# Patient Record
Sex: MALE | Race: BLACK OR AFRICAN AMERICAN | NOT HISPANIC OR LATINO | Employment: FULL TIME | URBAN - METROPOLITAN AREA
[De-identification: names, ages, dates, MRNs, and addresses within clinical notes are randomized per-mention and may not be internally consistent; named-entity substitution may affect disease eponyms.]

---

## 2018-12-24 ENCOUNTER — HOSPITAL ENCOUNTER (EMERGENCY)
Facility: HOSPITAL | Age: 53
Discharge: HOME | End: 2018-12-24
Attending: EMERGENCY MEDICINE

## 2018-12-24 VITALS
OXYGEN SATURATION: 97 % | DIASTOLIC BLOOD PRESSURE: 101 MMHG | WEIGHT: 202.63 LBS | TEMPERATURE: 98 F | HEART RATE: 94 BPM | SYSTOLIC BLOOD PRESSURE: 184 MMHG | RESPIRATION RATE: 15 BRPM | BODY MASS INDEX: 23.44 KG/M2 | HEIGHT: 78 IN

## 2018-12-24 DIAGNOSIS — M47.22 OSTEOARTHRITIS OF SPINE WITH RADICULOPATHY, CERVICAL REGION: ICD-10-CM

## 2018-12-24 DIAGNOSIS — M54.2 NECK PAIN: ICD-10-CM

## 2018-12-24 DIAGNOSIS — M54.2 CERVICALGIA: Primary | ICD-10-CM

## 2018-12-24 PROCEDURE — 99284 EMERGENCY DEPT VISIT MOD MDM: CPT | Mod: 25

## 2018-12-24 RX ORDER — DICLOFENAC SODIUM 50 MG/1
50 TABLET, DELAYED RELEASE ORAL 2 TIMES DAILY
Qty: 20 TABLET | Refills: 0 | OUTPATIENT
Start: 2018-12-24 | End: 2019-11-14

## 2018-12-24 RX ORDER — METHOCARBAMOL 500 MG/1
1000 TABLET, FILM COATED ORAL 3 TIMES DAILY
Qty: 30 TABLET | Refills: 0 | Status: SHIPPED | OUTPATIENT
Start: 2018-12-24 | End: 2018-12-29

## 2018-12-24 RX ORDER — METHYLPREDNISOLONE 4 MG/1
TABLET ORAL
Qty: 1 PACKAGE | Refills: 0 | Status: SHIPPED | OUTPATIENT
Start: 2018-12-24 | End: 2019-01-14

## 2018-12-24 NOTE — ED PROVIDER NOTES
"SCRIBE #1 NOTE: I, Ela Yeny, am scribing for, and in the presence of, FRANCO Marroquin. I have scribed the entire note.       History     Chief Complaint   Patient presents with    Back Pain     pt states there is something wrong with his vertebrae and is having difficulty swallowing     Review of patient's allergies indicates:  No Known Allergies      History of Present Illness     HPI    12/24/2018, 3:21 PM  History obtained from the patient      History of Present Illness: Srinivasan Saucedo III is a 53 y.o. male patient who presents to the Emergency Department for evaluation of upper back pain which onset gradually 3 days ago. Symptoms are constant and moderate in severity. Pt reports of "a burning sensation" to the upper left side of back radiating into the L shoulder. No mitigating or exacerbating factors reported. Associated sxs include trouble swallowing and neck pain. Patient denies any long car drives/travel, fever, chills, saddle anesthesia, bowel/bladder incontinence, neck stiffness, leg pain , and all other sxs at this time. No prior Tx. No further complaints or concerns at this time.         Arrival mode: Personal vehicle     PCP: Primary Doctor No        Past Medical History:  Past medical history reviewed not relevant      Past Surgical History:  Past surgical history reviewed not relevant      Family History:  Family history reviewed not relevant      Social History:  Social History     Tobacco Use    Smoking status: Current Every Day Smoker     Packs/day: 1.00     Types: Cigarettes    Smokeless tobacco: Never Used   Substance and Sexual Activity    Alcohol use: Yes     Comment: occasional    Drug use: No    Sexual activity: Unknown        Review of Systems     Review of Systems   Constitutional: Negative for chills and fever.   HENT: Negative for congestion and sore throat.    Respiratory: Negative for cough and shortness of breath.    Cardiovascular: Negative for chest pain and leg " swelling.   Gastrointestinal: Negative for abdominal pain, diarrhea, nausea and vomiting.   Genitourinary: Negative for dysuria, flank pain and hematuria.   Musculoskeletal: Positive for back pain and neck pain. Negative for joint swelling, myalgias and neck stiffness.   Skin: Negative for rash and wound.   Neurological: Negative for dizziness, light-headedness and headaches.   Psychiatric/Behavioral: Negative for agitation and confusion.   All other systems reviewed and are negative.       Physical Exam     Initial Vitals [12/24/18 1509]   BP Pulse Resp Temp SpO2   (!) 184/101 94 15 98 °F (36.7 °C) 97 %      MAP       --          Physical Exam  Nursing Notes and Vital Signs Reviewed.  Constitutional: Patient is in no apparent distress. Well-developed and well-nourished.  Head: Atraumatic. Normocephalic.  Eyes: PERRL. EOM intact. Conjunctivae are not pale. No scleral icterus.  ENT: Mucous membranes are moist. Oropharynx is clear and symmetric.    Neck: Supple. Full ROM. No lymphadenopathy. No cervical midline bony tenderness, deformities, or step-offs. L cervical paraspinal tenderness radiating to the L shoulder. Pain with rotation of neck to the left.  Cardiovascular: Regular rate. Regular rhythm. No murmurs, rubs, or gallops. Distal pulses are 2+ and symmetric.  Pulmonary/Chest: No respiratory distress. Clear to auscultation bilaterally. No wheezing or rales.  Abdominal: Soft and non-distended.  There is no tenderness.  No rebound, guarding, or rigidity. Good bowel sounds.  Genitourinary: No CVA tenderness  Musculoskeletal: Moves all extremities. No obvious deformities. No edema. No calf tenderness.  Back: No tenderness. No midline bony tenderness, deformities, or step-offs of the T-spine or L-spine. Skin appears normal without abrasions or bruising. No erythema, induration, or fluctuance.   Neurological: Awake and alert. Appropriate for age. negative straight leg raise bilaterally. No strength deficit; equal and  "5/5 in bilateral upper and lower extremities. No light touch sensory deficit. DTRs 2+ and equal. Normal gait. No acute focal neurological deficits noted.  Skin: Warm and dry.  Psychiatric: Normal affect. Good eye contact. Appropriate in content.     ED Course   Procedures  ED Vital Signs:  Vitals:    12/24/18 1509   BP: (!) 184/101   Pulse: 94   Resp: 15   Temp: 98 °F (36.7 °C)   TempSrc: Oral   SpO2: 97%   Weight: 91.9 kg (202 lb 9.6 oz)   Height: 6' 6" (1.981 m)       Abnormal Lab Results:  Labs Reviewed - No data to display     All Lab Results:  none    Imaging Results:  Imaging Results          X-Ray Cervical Spine AP And Lateral (Final result)  Result time 12/24/18 15:47:58    Final result by Yasir Hook Jr., MD (12/24/18 15:47:58)                 Impression:      No acute radiographic abnormality.  Degenerative change at C5-C6 and C6-C7.      Electronically signed by: Yasir Hook Jr., MD  Date:    12/24/2018  Time:    15:47             Narrative:    EXAMINATION:  XR CERVICAL SPINE AP LATERAL    CLINICAL HISTORY:  Cervicalgia    COMPARISON:  None.    FINDINGS:  Normal anatomic alignment.  All cervical vertebral bodies are normal in height.  Severe disc space height loss at C5-C6 and C6-C7 with marginal osteophytes.  There is also severe uncovertebral joint hypertrophy at these levels.  Prevertebral soft tissues are normal.                                            The Emergency Provider reviewed the vital signs and test results, which are outlined above.     ED Discussion     3:56 PM: Discussed with pt all pertinent ED information and results. Informed pt his x-ray no acute abnormality. Discussed pt dx of and plan of tx. Advised pt to f/u with orthopedics in 2 days. Gave pt all f/u and return to the ED instructions. All questions and concerns were addressed at this time. Pt expresses understanding of information and instructions, and is comfortable with plan to discharge. Pt is stable for discharge.    I " discussed with patient and/or family/caretaker that evaluation in the ED does not suggest any emergent or life threatening medical conditions requiring immediate intervention beyond what was provided in the ED, and I believe patient is safe for discharge.  Regardless, an unremarkable evaluation in the ED does not preclude the development or presence of a serious of life threatening condition. As such, patient was instructed to return immediately for any worsening or change in current symptoms.          ED Medication(s):  Medications - No data to display       Current Discharge Medication List      START taking these medications    Details   diclofenac (VOLTAREN) 50 MG EC tablet Take 1 tablet (50 mg total) by mouth 2 (two) times daily.  Qty: 20 tablet, Refills: 0      methocarbamol (ROBAXIN) 500 MG Tab Take 2 tablets (1,000 mg total) by mouth 3 (three) times daily. for 5 days  Qty: 30 tablet, Refills: 0      methylPREDNISolone (MEDROL DOSEPACK) 4 mg tablet As directed  Qty: 1 Package, Refills: 0             Follow-up Information     [facility]. Go in 2 days.    Specialty:  Orthopedics  Contact information:  [address] [facility] [address] [phone]  Additional information:  (off [facility]) 1st floor                        Medical Decision Making     Medical Decision Making:   Clinical Tests:   Radiological Study: Ordered and Reviewed             Scribe Attestation:   Scribe #1: I performed the above scribed service and the documentation accurately describes the services I performed. I attest to the accuracy of the note. 12/24/2018 3:56 PM    Attending:   Physician Attestation Statement for Scribe #1: I, FRANCO Marroquin, personally performed the services described in this documentation, as scribed by Ela Saini, in my presence, and it is both accurate and complete.           Clinical Impression       ICD-10-CM ICD-9-CM   1. Cervicalgia M54.2 723.1   2. Neck pain M54.2  723.1   3. Osteoarthritis of spine with radiculopathy, cervical region M47.22 721.0       Disposition:   Disposition: Discharged  Condition: Stable               FRANCO Lynn  12/28/18 0818

## 2019-01-25 ENCOUNTER — OFFICE VISIT (OUTPATIENT)
Dept: NEUROSURGERY | Facility: CLINIC | Age: 54
End: 2019-01-25
Payer: COMMERCIAL

## 2019-01-25 VITALS
DIASTOLIC BLOOD PRESSURE: 90 MMHG | HEART RATE: 88 BPM | SYSTOLIC BLOOD PRESSURE: 148 MMHG | WEIGHT: 201.94 LBS | BODY MASS INDEX: 23.36 KG/M2 | HEIGHT: 78 IN

## 2019-01-25 DIAGNOSIS — M50.30 DEGENERATIVE DISC DISEASE, CERVICAL: Primary | ICD-10-CM

## 2019-01-25 PROCEDURE — 99999 PR PBB SHADOW E&M-EST. PATIENT-LVL III: CPT | Mod: PBBFAC,,, | Performed by: NEUROLOGICAL SURGERY

## 2019-01-25 PROCEDURE — 3008F PR BODY MASS INDEX (BMI) DOCUMENTED: ICD-10-PCS | Mod: CPTII,S$GLB,, | Performed by: NEUROLOGICAL SURGERY

## 2019-01-25 PROCEDURE — 99999 PR PBB SHADOW E&M-EST. PATIENT-LVL III: ICD-10-PCS | Mod: PBBFAC,,, | Performed by: NEUROLOGICAL SURGERY

## 2019-01-25 PROCEDURE — 99204 OFFICE O/P NEW MOD 45 MIN: CPT | Mod: S$GLB,,, | Performed by: NEUROLOGICAL SURGERY

## 2019-01-25 PROCEDURE — 3008F BODY MASS INDEX DOCD: CPT | Mod: CPTII,S$GLB,, | Performed by: NEUROLOGICAL SURGERY

## 2019-01-25 PROCEDURE — 99204 PR OFFICE/OUTPT VISIT, NEW, LEVL IV, 45-59 MIN: ICD-10-PCS | Mod: S$GLB,,, | Performed by: NEUROLOGICAL SURGERY

## 2019-01-25 RX ORDER — METHOCARBAMOL 750 MG/1
750 TABLET, FILM COATED ORAL 3 TIMES DAILY PRN
Qty: 60 TABLET | Refills: 0 | Status: SHIPPED | OUTPATIENT
Start: 2019-01-25 | End: 2019-11-14

## 2019-02-08 ENCOUNTER — TELEPHONE (OUTPATIENT)
Dept: RADIOLOGY | Facility: HOSPITAL | Age: 54
End: 2019-02-08

## 2019-02-11 ENCOUNTER — TELEPHONE (OUTPATIENT)
Dept: NEUROSURGERY | Facility: CLINIC | Age: 54
End: 2019-02-11

## 2019-02-11 NOTE — TELEPHONE ENCOUNTER
Left message informing pt he may need to be rescheduled, d/t pt canceling MRI or if MRI was done at another facility bring the disc to appt, either way please give call back and left call back number//ns

## 2019-02-21 ENCOUNTER — TELEPHONE (OUTPATIENT)
Dept: NEUROSURGERY | Facility: CLINIC | Age: 54
End: 2019-02-21

## 2019-02-25 ENCOUNTER — TELEPHONE (OUTPATIENT)
Dept: NEUROSURGERY | Facility: CLINIC | Age: 54
End: 2019-02-25

## 2019-02-25 NOTE — TELEPHONE ENCOUNTER
Re: [facility] f/u appt. Called to RS due to schedule/location changes. His f/u appt was moved to Friday 03/08/19 at [facility] instead of 03/06/19. Pt verbalized understanding.

## 2019-03-01 ENCOUNTER — TELEPHONE (OUTPATIENT)
Dept: RADIOLOGY | Facility: HOSPITAL | Age: 54
End: 2019-03-01

## 2019-03-02 ENCOUNTER — HOSPITAL ENCOUNTER (OUTPATIENT)
Dept: RADIOLOGY | Facility: HOSPITAL | Age: 54
Discharge: HOME | End: 2019-03-02
Attending: NEUROLOGICAL SURGERY
Payer: COMMERCIAL

## 2019-03-02 DIAGNOSIS — M50.30 DEGENERATIVE DISC DISEASE, CERVICAL: ICD-10-CM

## 2019-03-02 PROCEDURE — 72141 MRI NECK SPINE W/O DYE: CPT | Mod: TC

## 2019-03-02 PROCEDURE — 72141 MRI CERVICAL SPINE WITHOUT CONTRAST: ICD-10-PCS | Mod: 26,,, | Performed by: RADIOLOGY

## 2019-03-02 PROCEDURE — 72141 MRI NECK SPINE W/O DYE: CPT | Mod: 26,,, | Performed by: RADIOLOGY

## 2019-11-14 ENCOUNTER — HOSPITAL ENCOUNTER (EMERGENCY)
Facility: HOSPITAL | Age: 54
Discharge: HOME | End: 2019-11-14
Attending: FAMILY MEDICINE

## 2019-11-14 VITALS
HEART RATE: 78 BPM | SYSTOLIC BLOOD PRESSURE: 162 MMHG | BODY MASS INDEX: 22.12 KG/M2 | TEMPERATURE: 98 F | WEIGHT: 191.13 LBS | OXYGEN SATURATION: 100 % | HEIGHT: 78 IN | DIASTOLIC BLOOD PRESSURE: 90 MMHG | RESPIRATION RATE: 20 BRPM

## 2019-11-14 DIAGNOSIS — R03.0 ELEVATED BLOOD PRESSURE READING: ICD-10-CM

## 2019-11-14 DIAGNOSIS — M54.50 ACUTE MIDLINE LOW BACK PAIN WITHOUT SCIATICA: Primary | ICD-10-CM

## 2019-11-14 PROCEDURE — 63600175 PHARM REV CODE 636 W HCPCS: Performed by: PHYSICIAN ASSISTANT

## 2019-11-14 PROCEDURE — 96372 THER/PROPH/DIAG INJ SC/IM: CPT

## 2019-11-14 PROCEDURE — 99284 EMERGENCY DEPT VISIT MOD MDM: CPT | Mod: 25

## 2019-11-14 RX ORDER — KETOROLAC TROMETHAMINE 30 MG/ML
15 INJECTION, SOLUTION INTRAMUSCULAR; INTRAVENOUS
Status: COMPLETED | OUTPATIENT
Start: 2019-11-14 | End: 2019-11-14

## 2019-11-14 RX ORDER — TRAMADOL HYDROCHLORIDE 50 MG/1
50 TABLET ORAL EVERY 6 HOURS PRN
Qty: 12 TABLET | Refills: 0 | Status: SHIPPED | OUTPATIENT
Start: 2019-11-14 | End: 2022-09-02

## 2019-11-14 RX ORDER — NAPROXEN 375 MG/1
375 TABLET ORAL 2 TIMES DAILY WITH MEALS
Qty: 12 TABLET | Refills: 0 | Status: SHIPPED | OUTPATIENT
Start: 2019-11-14 | End: 2022-09-02

## 2019-11-14 RX ORDER — ORPHENADRINE CITRATE 100 MG/1
100 TABLET, EXTENDED RELEASE ORAL 2 TIMES DAILY
Qty: 12 TABLET | Refills: 0 | Status: SHIPPED | OUTPATIENT
Start: 2019-11-14 | End: 2022-09-02

## 2019-11-14 RX ADMIN — KETOROLAC TROMETHAMINE 15 MG: 30 INJECTION, SOLUTION INTRAMUSCULAR; INTRAVENOUS at 08:11
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
## 2020-10-25 ENCOUNTER — HOSPITAL ENCOUNTER (OUTPATIENT)
Facility: HOSPITAL | Age: 55
Discharge: HOME | End: 2020-10-26
Attending: EMERGENCY MEDICINE | Admitting: HOSPITALIST

## 2020-10-25 DIAGNOSIS — J18.9 PNEUMONIA OF LEFT LOWER LOBE DUE TO INFECTIOUS ORGANISM: Primary | ICD-10-CM

## 2020-10-25 DIAGNOSIS — R79.89 ELEVATED TROPONIN: ICD-10-CM

## 2020-10-25 DIAGNOSIS — R07.9 CHEST PAIN: ICD-10-CM

## 2020-10-25 DIAGNOSIS — A41.9 SEPSIS, DUE TO UNSPECIFIED ORGANISM, UNSPECIFIED WHETHER ACUTE ORGAN DYSFUNCTION PRESENT: ICD-10-CM

## 2020-10-25 DIAGNOSIS — R00.0 RAPID HEART BEAT: ICD-10-CM

## 2020-10-25 PROBLEM — E87.6 HYPOKALEMIA: Status: ACTIVE | Noted: 2020-10-25

## 2020-10-25 PROBLEM — Z72.0 TOBACCO ABUSE: Status: ACTIVE | Noted: 2020-10-25

## 2020-10-25 LAB
ALBUMIN SERPL BCP-MCNC: 3.4 G/DL (ref 3.5–5.2)
ALP SERPL-CCNC: 121 U/L (ref 55–135)
ALT SERPL W/O P-5'-P-CCNC: 17 U/L (ref 10–44)
ANION GAP SERPL CALC-SCNC: 15 MMOL/L (ref 8–16)
AST SERPL-CCNC: 20 U/L (ref 10–40)
BASOPHILS # BLD AUTO: 0.03 K/UL (ref 0–0.2)
BASOPHILS NFR BLD: 0.1 % (ref 0–1.9)
BILIRUB SERPL-MCNC: 1.5 MG/DL (ref 0.1–1)
BILIRUB UR QL STRIP: ABNORMAL
BNP SERPL-MCNC: 22 PG/ML (ref 0–99)
BUN SERPL-MCNC: 15 MG/DL (ref 6–20)
CALCIUM SERPL-MCNC: 9.7 MG/DL (ref 8.7–10.5)
CHLORIDE SERPL-SCNC: 102 MMOL/L (ref 95–110)
CLARITY UR: CLEAR
CO2 SERPL-SCNC: 21 MMOL/L (ref 23–29)
COLOR UR: YELLOW
CREAT SERPL-MCNC: 1.4 MG/DL (ref 0.5–1.4)
D DIMER PPP IA.FEU-MCNC: 2.57 MG/L FEU
DIFFERENTIAL METHOD: ABNORMAL
EOSINOPHIL # BLD AUTO: 0 K/UL (ref 0–0.5)
EOSINOPHIL NFR BLD: 0 % (ref 0–8)
ERYTHROCYTE [DISTWIDTH] IN BLOOD BY AUTOMATED COUNT: 13 % (ref 11.5–14.5)
EST. GFR  (AFRICAN AMERICAN): >60 ML/MIN/1.73 M^2
EST. GFR  (NON AFRICAN AMERICAN): 56 ML/MIN/1.73 M^2
GLUCOSE SERPL-MCNC: 144 MG/DL (ref 70–110)
GLUCOSE UR QL STRIP: NEGATIVE
HCT VFR BLD AUTO: 38.8 % (ref 40–54)
HCV AB SERPL QL IA: NEGATIVE
HGB BLD-MCNC: 13.2 G/DL (ref 14–18)
HGB UR QL STRIP: NEGATIVE
HIV 1+2 AB+HIV1 P24 AG SERPL QL IA: NEGATIVE
IMM GRANULOCYTES # BLD AUTO: 0.19 K/UL (ref 0–0.04)
IMM GRANULOCYTES NFR BLD AUTO: 0.9 % (ref 0–0.5)
INFLUENZA A, MOLECULAR: NEGATIVE
INFLUENZA B, MOLECULAR: NEGATIVE
KETONES UR QL STRIP: ABNORMAL
LACTATE SERPL-SCNC: 0.8 MMOL/L (ref 0.5–2.2)
LACTATE SERPL-SCNC: 0.9 MMOL/L (ref 0.5–2.2)
LEUKOCYTE ESTERASE UR QL STRIP: NEGATIVE
LYMPHOCYTES # BLD AUTO: 1.2 K/UL (ref 1–4.8)
LYMPHOCYTES NFR BLD: 5.9 % (ref 18–48)
MCH RBC QN AUTO: 34.9 PG (ref 27–31)
MCHC RBC AUTO-ENTMCNC: 34 G/DL (ref 32–36)
MCV RBC AUTO: 103 FL (ref 82–98)
MONOCYTES # BLD AUTO: 1.5 K/UL (ref 0.3–1)
MONOCYTES NFR BLD: 7.2 % (ref 4–15)
NEUTROPHILS # BLD AUTO: 17.7 K/UL (ref 1.8–7.7)
NEUTROPHILS NFR BLD: 85.9 % (ref 38–73)
NITRITE UR QL STRIP: NEGATIVE
NRBC BLD-RTO: 0 /100 WBC
PH UR STRIP: 6 [PH] (ref 5–8)
PLATELET # BLD AUTO: 407 K/UL (ref 150–350)
PMV BLD AUTO: 9.7 FL (ref 9.2–12.9)
POTASSIUM SERPL-SCNC: 3.3 MMOL/L (ref 3.5–5.1)
PROT SERPL-MCNC: 8.3 G/DL (ref 6–8.4)
PROT UR QL STRIP: ABNORMAL
RBC # BLD AUTO: 3.78 M/UL (ref 4.6–6.2)
SARS-COV-2 RDRP RESP QL NAA+PROBE: NEGATIVE
SODIUM SERPL-SCNC: 138 MMOL/L (ref 136–145)
SP GR UR STRIP: >=1.03 (ref 1–1.03)
SPECIMEN SOURCE: NORMAL
TROPONIN I SERPL DL<=0.01 NG/ML-MCNC: 0.02 NG/ML (ref 0–0.03)
TROPONIN I SERPL DL<=0.01 NG/ML-MCNC: 0.03 NG/ML (ref 0–0.03)
URN SPEC COLLECT METH UR: ABNORMAL
UROBILINOGEN UR STRIP-ACNC: ABNORMAL EU/DL
WBC # BLD AUTO: 20.61 K/UL (ref 3.9–12.7)

## 2020-10-25 PROCEDURE — 83880 ASSAY OF NATRIURETIC PEPTIDE: CPT

## 2020-10-25 PROCEDURE — U0002 COVID-19 LAB TEST NON-CDC: HCPCS

## 2020-10-25 PROCEDURE — 93010 ELECTROCARDIOGRAM REPORT: CPT | Mod: ,,, | Performed by: INTERNAL MEDICINE

## 2020-10-25 PROCEDURE — G0378 HOSPITAL OBSERVATION PER HR: HCPCS

## 2020-10-25 PROCEDURE — 83605 ASSAY OF LACTIC ACID: CPT | Mod: 91

## 2020-10-25 PROCEDURE — 96361 HYDRATE IV INFUSION ADD-ON: CPT

## 2020-10-25 PROCEDURE — 93010 EKG 12-LEAD: ICD-10-PCS | Mod: ,,, | Performed by: INTERNAL MEDICINE

## 2020-10-25 PROCEDURE — 86803 HEPATITIS C AB TEST: CPT

## 2020-10-25 PROCEDURE — 84484 ASSAY OF TROPONIN QUANT: CPT

## 2020-10-25 PROCEDURE — 25000003 PHARM REV CODE 250: Performed by: NURSE PRACTITIONER

## 2020-10-25 PROCEDURE — 84484 ASSAY OF TROPONIN QUANT: CPT | Mod: 91

## 2020-10-25 PROCEDURE — 25000003 PHARM REV CODE 250: Performed by: EMERGENCY MEDICINE

## 2020-10-25 PROCEDURE — 99900035 HC TECH TIME PER 15 MIN (STAT)

## 2020-10-25 PROCEDURE — 63600175 PHARM REV CODE 636 W HCPCS: Performed by: NURSE PRACTITIONER

## 2020-10-25 PROCEDURE — 85025 COMPLETE CBC W/AUTO DIFF WBC: CPT

## 2020-10-25 PROCEDURE — 81003 URINALYSIS AUTO W/O SCOPE: CPT

## 2020-10-25 PROCEDURE — 87502 INFLUENZA DNA AMP PROBE: CPT

## 2020-10-25 PROCEDURE — 99291 CRITICAL CARE FIRST HOUR: CPT | Mod: 25

## 2020-10-25 PROCEDURE — 25500020 PHARM REV CODE 255: Performed by: EMERGENCY MEDICINE

## 2020-10-25 PROCEDURE — 85379 FIBRIN DEGRADATION QUANT: CPT

## 2020-10-25 PROCEDURE — 93005 ELECTROCARDIOGRAM TRACING: CPT

## 2020-10-25 PROCEDURE — 86703 HIV-1/HIV-2 1 RESULT ANTBDY: CPT

## 2020-10-25 PROCEDURE — 80053 COMPREHEN METABOLIC PANEL: CPT

## 2020-10-25 PROCEDURE — 96375 TX/PRO/DX INJ NEW DRUG ADDON: CPT

## 2020-10-25 PROCEDURE — 94761 N-INVAS EAR/PLS OXIMETRY MLT: CPT

## 2020-10-25 PROCEDURE — 87040 BLOOD CULTURE FOR BACTERIA: CPT | Mod: 59

## 2020-10-25 PROCEDURE — 36415 COLL VENOUS BLD VENIPUNCTURE: CPT

## 2020-10-25 PROCEDURE — 96376 TX/PRO/DX INJ SAME DRUG ADON: CPT

## 2020-10-25 PROCEDURE — 96365 THER/PROPH/DIAG IV INF INIT: CPT

## 2020-10-25 PROCEDURE — 63600175 PHARM REV CODE 636 W HCPCS: Performed by: EMERGENCY MEDICINE

## 2020-10-25 PROCEDURE — 63600175 PHARM REV CODE 636 W HCPCS: Performed by: FAMILY MEDICINE

## 2020-10-25 PROCEDURE — 83605 ASSAY OF LACTIC ACID: CPT

## 2020-10-25 PROCEDURE — 96367 TX/PROPH/DG ADDL SEQ IV INF: CPT

## 2020-10-25 RX ORDER — METHOCARBAMOL 750 MG/1
500 TABLET, FILM COATED ORAL 4 TIMES DAILY
COMMUNITY
End: 2022-09-02

## 2020-10-25 RX ORDER — METHYLPREDNISOLONE SOD SUCC 125 MG
125 VIAL (EA) INJECTION
Status: COMPLETED | OUTPATIENT
Start: 2020-10-25 | End: 2020-10-25

## 2020-10-25 RX ORDER — METHYLPREDNISOLONE SOD SUCC 125 MG
80 VIAL (EA) INJECTION EVERY 6 HOURS
Status: DISCONTINUED | OUTPATIENT
Start: 2020-10-25 | End: 2020-10-26

## 2020-10-25 RX ORDER — IPRATROPIUM BROMIDE AND ALBUTEROL SULFATE 2.5; .5 MG/3ML; MG/3ML
3 SOLUTION RESPIRATORY (INHALATION) EVERY 6 HOURS PRN
Status: DISCONTINUED | OUTPATIENT
Start: 2020-10-25 | End: 2020-10-26 | Stop reason: HOSPADM

## 2020-10-25 RX ORDER — ASPIRIN 325 MG
325 TABLET ORAL
Status: COMPLETED | OUTPATIENT
Start: 2020-10-25 | End: 2020-10-25

## 2020-10-25 RX ORDER — SODIUM CHLORIDE 0.9 % (FLUSH) 0.9 %
10 SYRINGE (ML) INJECTION
Status: DISCONTINUED | OUTPATIENT
Start: 2020-10-25 | End: 2020-10-26 | Stop reason: HOSPADM

## 2020-10-25 RX ORDER — POTASSIUM CHLORIDE 20 MEQ/1
20 TABLET, EXTENDED RELEASE ORAL ONCE
Status: COMPLETED | OUTPATIENT
Start: 2020-10-25 | End: 2020-10-25

## 2020-10-25 RX ORDER — GABAPENTIN 300 MG/1
300 CAPSULE ORAL 3 TIMES DAILY
COMMUNITY
End: 2022-09-02

## 2020-10-25 RX ORDER — ENOXAPARIN SODIUM 100 MG/ML
40 INJECTION SUBCUTANEOUS EVERY 24 HOURS
Status: DISCONTINUED | OUTPATIENT
Start: 2020-10-25 | End: 2020-10-26 | Stop reason: HOSPADM

## 2020-10-25 RX ORDER — NABUMETONE 500 MG/1
500 TABLET, FILM COATED ORAL 2 TIMES DAILY
COMMUNITY
End: 2022-09-02

## 2020-10-25 RX ORDER — NAPROXEN SODIUM 220 MG/1
81 TABLET, FILM COATED ORAL DAILY
Status: DISCONTINUED | OUTPATIENT
Start: 2020-10-26 | End: 2020-10-26 | Stop reason: HOSPADM

## 2020-10-25 RX ORDER — NICOTINE 7MG/24HR
1 PATCH, TRANSDERMAL 24 HOURS TRANSDERMAL DAILY
Status: DISCONTINUED | OUTPATIENT
Start: 2020-10-26 | End: 2020-10-26 | Stop reason: HOSPADM

## 2020-10-25 RX ADMIN — POTASSIUM CHLORIDE 20 MEQ: 1500 TABLET, EXTENDED RELEASE ORAL at 06:10

## 2020-10-25 RX ADMIN — METHYLPREDNISOLONE SODIUM SUCCINATE 80 MG: 125 INJECTION, POWDER, FOR SOLUTION INTRAMUSCULAR; INTRAVENOUS at 09:10

## 2020-10-25 RX ADMIN — IOHEXOL 100 ML: 350 INJECTION, SOLUTION INTRAVENOUS at 03:10

## 2020-10-25 RX ADMIN — METHYLPREDNISOLONE SODIUM SUCCINATE 125 MG: 125 INJECTION, POWDER, FOR SOLUTION INTRAMUSCULAR; INTRAVENOUS at 04:10

## 2020-10-25 RX ADMIN — AZITHROMYCIN MONOHYDRATE 500 MG: 500 INJECTION, POWDER, LYOPHILIZED, FOR SOLUTION INTRAVENOUS at 02:10

## 2020-10-25 RX ADMIN — CEFTRIAXONE 2 G: 2 INJECTION, SOLUTION INTRAVENOUS at 02:10

## 2020-10-25 RX ADMIN — SODIUM CHLORIDE, SODIUM LACTATE, POTASSIUM CHLORIDE, AND CALCIUM CHLORIDE 1000 ML: .6; .31; .03; .02 INJECTION, SOLUTION INTRAVENOUS at 12:10

## 2020-10-25 RX ADMIN — ASPIRIN 325 MG: 325 TABLET ORAL at 04:10

## 2020-10-25 NOTE — SUBJECTIVE & OBJECTIVE
History reviewed. No pertinent past medical history.    History reviewed. No pertinent surgical history.    Review of patient's allergies indicates:  No Known Allergies    No current facility-administered medications on file prior to encounter.      Current Outpatient Medications on File Prior to Encounter   Medication Sig    gabapentin (NEURONTIN) 300 MG capsule Take 300 mg by mouth 3 (three) times daily.    methocarbamoL (ROBAXIN) 750 MG Tab Take 500 mg by mouth 4 (four) times daily.    nabumetone (RELAFEN) 500 MG tablet Take 500 mg by mouth 2 (two) times daily.    naproxen (NAPROSYN) 375 MG tablet Take 1 tablet (375 mg total) by mouth 2 (two) times daily with meals. Prn pain    orphenadrine (NORFLEX) 100 mg tablet Take 1 tablet (100 mg total) by mouth 2 (two) times daily.    traMADol (ULTRAM) 50 mg tablet Take 1 tablet (50 mg total) by mouth every 6 (six) hours as needed for Pain.     Family History     None        Tobacco Use    Smoking status: Current Every Day Smoker     Packs/day: 1.00     Types: Cigarettes    Smokeless tobacco: Never Used   Substance and Sexual Activity    Alcohol use: Yes     Comment: occasional    Drug use: No    Sexual activity: Not on file     Review of Systems   Constitutional: Positive for diaphoresis. Negative for appetite change, chills, fatigue and fever.   HENT: Negative for congestion, postnasal drip, sinus pressure, sore throat and trouble swallowing.    Eyes: Negative.    Respiratory: Negative for cough, shortness of breath and wheezing.    Cardiovascular: Positive for chest pain (with cough ) and palpitations. Negative for leg swelling.   Gastrointestinal: Negative for abdominal distention, abdominal pain, diarrhea, nausea and vomiting.   Endocrine: Negative for cold intolerance and heat intolerance.   Genitourinary: Negative for difficulty urinating, dysuria, frequency and urgency.   Musculoskeletal: Positive for arthralgias. Negative for back pain, joint swelling  and myalgias.   Skin: Negative for color change and wound.   Allergic/Immunologic: Negative for environmental allergies and food allergies.   Neurological: Positive for headaches. Negative for dizziness and weakness.   Hematological: Does not bruise/bleed easily.   Psychiatric/Behavioral: Negative for agitation, confusion and sleep disturbance. The patient is not nervous/anxious.      Objective:     Vital Signs (Most Recent):  Temp: 98.6 °F (37 °C) (10/25/20 1114)  Pulse: 96 (10/25/20 1632)  Resp: 20 (10/25/20 1632)  BP: (!) 144/93 (10/25/20 1632)  SpO2: 98 % (10/25/20 1632) Vital Signs (24h Range):  Temp:  [98.6 °F (37 °C)] 98.6 °F (37 °C)  Pulse:  [] 96  Resp:  [20-22] 20  SpO2:  [97 %-98 %] 98 %  BP: (103-144)/(76-93) 144/93     Weight: 90.4 kg (199 lb 4.7 oz)  Body mass index is 22.74 kg/m².    Physical Exam  HENT:      Nose: Nose normal.      Mouth/Throat:      Pharynx: Oropharynx is clear.   Eyes:      Conjunctiva/sclera: Conjunctivae normal.   Neck:      Musculoskeletal: Normal range of motion and neck supple.   Cardiovascular:      Rate and Rhythm: Regular rhythm. Tachycardia present.      Pulses: Normal pulses.      Heart sounds: Normal heart sounds.   Pulmonary:      Effort: Pulmonary effort is normal.      Breath sounds: Normal breath sounds.   Abdominal:      General: Bowel sounds are normal. There is no distension.      Palpations: Abdomen is soft.      Tenderness: There is no abdominal tenderness.   Genitourinary:     Comments: Deferred   Musculoskeletal: Normal range of motion.   Skin:     General: Skin is warm and dry.   Neurological:      General: No focal deficit present.      Mental Status: He is alert and oriented to person, place, and time.   Psychiatric:         Mood and Affect: Mood normal.         Behavior: Behavior normal.             Significant Labs: All pertinent labs within the past 24 hours have been reviewed.    Significant Imaging:   Imaging Results           CTA Chest  Non-Coronary - PE Study (Final result)  Result time 10/25/20 16:07:04    Final result by Bairon Regalado MD (10/25/20 16:07:04)                 Impression:      No pulmonary thromboembolism.    Left basilar consolidation with air bronchograms concerning for pneumonia.    This report was flagged in Epic as abnormal.    All CT scans at this facility are performed  using dose modulation techniques as appropriate to performed exam including the following:  automated exposure control; adjustment of mA and/or kV according to the patients size (this includes techniques or standardized protocols for targeted exams where dose is matched to indication/reason for exam: i.e. extremities or head);  iterative reconstruction technique.      Electronically signed by: Bairon Regalado  Date:    10/25/2020  Time:    16:07             Narrative:    EXAMINATION:  CTA CHEST NON CORONARY    CLINICAL HISTORY:  PE suspected, intermediate prob, positive D-dimer;    TECHNIQUE:  Low dose axial images, sagittal and coronal reformations were obtained from the thoracic inlet to the lung bases following the IV administration of 100 mL of Omnipaque 350.  Contrast timing was optimized to evaluate the pulmonary arteries.  MIP images were performed.    COMPARISON:  10/25/2020.    FINDINGS:  Base of Neck: No significant abnormality.    Thoracic soft tissues: Unremarkable.    Aorta: Left-sided aortic arch.  No aneurysm and no significant atherosclerosis    Heart: Normal size. No effusion.    Pulmonary vasculature: Pulmonary arteries are well opacified.  There is no pulmonary thromboembolism.    Brandy/Mediastinum: No pathologic arben enlargement.    Airways: Patent.    Lungs/Pleura: Left basilar consolidation with air bronchograms concerning for pneumonia.  No associated pulmonary thromboembolism in this area or elsewhere.  No pleural fluid.  No pneumothorax.    Esophagus: Unremarkable.    Upper Abdomen: No abnormality of the partially imaged upper  abdomen.    Bones: No acute fracture. No suspicious lytic or sclerotic lesions.                               X-Ray Chest AP Portable (Final result)  Result time 10/25/20 12:17:46    Final result by Srini Mackenzie III, MD (10/25/20 12:17:46)                 Impression:      Left basilar infiltrate and/or atelectasis.  Follow-up recommended to document resolution.      Electronically signed by: Srini Mackenzie MD  Date:    10/25/2020  Time:    12:17             Narrative:    EXAMINATION:  XR CHEST AP PORTABLE    CLINICAL HISTORY:  Sepsis;    COMPARISON:  Two thousand fifteen    FINDINGS:  The heart size remains normal with mild tortuosity of the thoracic aorta.  The right lung is clear.  There is minimal left basilar infiltrate or atelectatic change.  Left mid to upper lung field is clear.

## 2020-10-25 NOTE — ASSESSMENT & PLAN NOTE
-pleuritic pain suspected as pt reports CP only occurs with increased cough   -symptom resolved upon exam   -ASA and Lovenox   -troponin levels trended- initial 0.028   -will consider need for Echo or Cardiology consult pending clincal course
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
## 2020-10-25 NOTE — ASSESSMENT & PLAN NOTE
-WBC 20.61, ,  and RR 30 with source: PNA  -IV antibiotics   -IV hydration   -blood cultures pending   -supplemental oxygen   -lactic acid 0.9

## 2020-10-25 NOTE — ED NOTES
Pediatric Critical Care Progress Note    Hospital Day: 91  Date: 2017     Time: 2:08 PM      SUBJECTIVE:     24 Hour Review  Treatment decision to home ventilator (Trilogy) yesterday and has tolerated without issue.    Review of Systems: I have reviewed the patent's history and at least 10 organ systems and found them to be unchanged other than noted above    OBJECTIVE:     Vital Signs Last 24 hours:    SpO2  Min: 98 %  Max: 100 %  O2 (LPM)  Min: 2  Max: 3  NIBP  Min: 84/45  Max: 113/55  Heart Rate (Monitored)  Min: 133  Max: 189  Temp  Min: 36.1 °C (97 °F)  Max: 36.9 °C (98.4 °F)    Fluid balance:       Intake/Output Summary (Last 24 hours) at 12/01/17 1408  Last data filed at 12/01/17 0800   Gross per 24 hour   Intake              600 ml   Output              262 ml   Net              338 ml       Physical Exam  Gen:  Alert, comfortable, non-toxic  HEENT: NC/AT, PERRL, conjunctiva clear, nares clear, MMM, neck supple  Cardio: RRR, nl S1 S2, no murmur, pulses full and equal  Resp:  CTAB, no wheeze or rales, no increased respiratory distress on the Trilogy vent  GI:  Soft, ND/NT, normal bowel sounds, no guarding/rebound  Skin: no rash  Extremities: Cap refill <3sec, WWP, ARDON well  Neuro: Non-focal, grossly intact, no deficits    O2 Delivery: Ventilator O2 (LPM): 2.5  Damico Vent Mode: PSMIV-APV  Rate (breaths/min): 24     P Support: 16  PEEP/CPAP: 6  TI (Seconds): 0.6  FiO2:  (2L bled in)        Labs and Imaging:  Recent Results (from the past 24 hour(s))   ISTAT CAPILLARY BLOOD GAS    Collection Time: 11/30/17  6:04 PM   Result Value Ref Range    Ph 7.356 7.300 - 7.460    Pco2 57.5 (H) 26.0 - 47.0 mmHg    Po2 45 42 - 58 mmHg    Tco2 34 (H) 20 - 33 mmol/L    SO2 77 71 - 100 %    Hco3 32.2 (H) 17.0 - 25.0 mmol/L    BE 5 (H) -4 - 3 mmol/L    Body Temp 98.0 F degrees    Ph Temp Cor 7.361 7.300 - 7.460    Pco2 Temp Cor 56.7 (H) 26.0 - 47.0 mmHg    Po2 Temp Cor 44 42 - 58 mmHg    Specimen Capillary    ISTAT  Report given to ABDIRIZAK Mattson   SODIUM    Collection Time: 11/30/17  6:04 PM   Result Value Ref Range    Istat Sodium 136 135 - 145 mmol/L   ISTAT POTASSIUM    Collection Time: 11/30/17  6:04 PM   Result Value Ref Range    Istat Potassium 6.8 (HH) 3.6 - 5.5 mmol/L   ISTAT IONIZED CA    Collection Time: 11/30/17  6:04 PM   Result Value Ref Range    Istat Ionized Calcium 1.39 (H) 1.10 - 1.30 mmol/L   ISTAT HEMATOCRIT AND HEMOGLOBIN    Collection Time: 11/30/17  6:04 PM   Result Value Ref Range    Istat Hematocrit 37 (H) 29 - 36 %    Istat Hemoglobin 12.6 (H) 9.7 - 12.0 g/dL       Blood Culture:  No results found for this or any previous visit (from the past 72 hour(s)).  Respiratory Culture:  No results found for this or any previous visit (from the past 72 hour(s)).  Urine Culture:  No results found for this or any previous visit (from the past 72 hour(s)).  Stool Culture:  No results found for this or any previous visit (from the past 72 hour(s)).  Abx:    CURRENT MEDICATIONS:  Current Facility-Administered Medications   Medication Dose Route Frequency Provider Last Rate Last Dose   • acetaminophen (TYLENOL) oral suspension 83.2 mg  15 mg/kg Oral Q4HRS PRN Milo Soler, A.P.N.       • albuterol (PROVENTIL) 2.5mg/0.5ml nebulizer solution 2.5 mg  2.5 mg Nebulization Q2HRS PRN (RT) Milo Soler, A.P.N.   2.5 mg at 11/17/17 1040   • albuterol (PROVENTIL) 2.5mg/0.5ml nebulizer solution 2.5 mg  2.5 mg Nebulization Q8HRS (RT) Stefany Marshall M.D.   2.5 mg at 12/01/17 0623          ASSESSMENT:     Anatoly  is a 2 m.o. 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 She is doing well but did not tolerate attempts to transition to home ventilator 11/22 or 11/23.  She was be able to transition to the [facility] home ventilator on November 30, 2017.      She remains in the PICU for ongoing ventilator support.  Presently is tolerating the home ventilator with volume mode on [facility].      Patient  Active Problem List    Diagnosis Date Noted   • Chronic lung disease in  2017     Priority: High   • Jarcho-Veliz syndrome 2017     Priority: High   • Tracheostomy dependent (CMS-HCC) 2017     Priority: Medium   • Gastrostomy tube dependent (CMS-HCC) 2017     Priority: Medium   • Ventilator dependence (CMS-[facility]) 2017     Priority: Medium   • Failure to thrive in infant 2017     Priority: Medium   • Respiratory distress of  2017     Priority: Medium   • TIS (thoracic insufficiency syndrome) 2017         PLAN:     RESP: Continue to monitor saturation and for any respiratory distress.  Provide oxygen as needed to maintain saturations >90%. Continue home Vent with Trilogy at adjust O2 as needed.  Have discussed with mother regarding planning for discharge to home potentially next week.She understands that teaching will continue and that home supplies will be arranged      CV: Monitor hemodynamics.      GI: Diet: at goal feeds with q4 EBM 120ml per GT    FEN/Endo/Renal: Follow electrolytes, correct as needed.     ID: Monitor for fever, evidence of infection.  Abx: None     HEME: Evaluate CBC and coags as indicated.     NEURO: Follow mental status, provide comfort as indicated.  Continue PT/OT/speech    DISPO: Patient care and plans reviewed and directed with PICU team on rounds today.  Spoke with mother via  at bedside, questions addressed.  Transition to home and discussed at length with bedside nurse and  present.     Patient continues to require critical care due to at least one organ system in failure requiring monitoring in ICU.    Time Spent : 35 minutes including bedside evaluation, discussion with healthcare team and family discussions.    The above note was signed by : Miles Bustillos , PICU Attending

## 2020-10-25 NOTE — ASSESSMENT & PLAN NOTE
-Pt admitted to Observation   -IV antibiotics   -IV Steroids   -Duonebs as needed   -Incentive spirometry   -supplemental oxygen as needed   -blood cultures pending  -imaging confirms no pulmonary thromboembolism and left basilar consolidation with air bronchograms concerning for pneumonia.
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
## 2020-10-25 NOTE — ED PROVIDER NOTES
SCRIBE #1 NOTE: I, Yvonne Justice, am scribing for, and in the presence of, Velasquez Rosa MD. I have scribed the HPI, ROS, and PEx.     SCRIBE #2 NOTE: I, Eli Irwin, am scribing for, and in the presence of,  Myah Siegel MD. I have scribed the remaining portions of the note not scribed by Scribe #1.      History     Chief Complaint   Patient presents with    Cough     dry cough x 2 weeks     Review of patient's allergies indicates:  No Known Allergies      History of Present Illness     HPI    10/25/2020, 12:05 PM  History obtained from the patient      History of Present Illness: Srinivasan Saucedo III is a 55 y.o. male patient who presents to the Emergency Department for evaluation of unproductive cough which onset gradually 2 weeks ago and worsened over the past 3-4 days. Symptoms are constant and moderate in severity. Symptoms are exacerbated at night. No mitigating factors reported. Associated sxs include diaphoresis (at night) and L sided CP. Patient denies any fever, chills, SOB, wheezing, leg swelling, palpitations, n/v, HA, dizziness, lightheadedness, and all other sxs at this time. No prior tx. No further complaints or concerns at this time.       Arrival mode: Personal vehicle    PCP: Primary Doctor No        Past Medical History:  History reviewed. No pertinent past medical history.    Past Surgical History:  History reviewed. No pertinent surgical history.      Family History:  History reviewed. No pertinent family history.    Social History:  Social History     Tobacco Use    Smoking status: Current Every Day Smoker     Packs/day: 1.00     Types: Cigarettes    Smokeless tobacco: Never Used   Substance and Sexual Activity    Alcohol use: Yes     Comment: occasional    Drug use: No    Sexual activity: Unknown        Review of Systems     Review of Systems   Constitutional: Positive for diaphoresis. Negative for activity change, chills and fever.   HENT: Negative for congestion, rhinorrhea, sneezing,  sore throat and trouble swallowing.    Eyes: Negative for pain.   Respiratory: Positive for cough. Negative for chest tightness, shortness of breath, wheezing and stridor.    Cardiovascular: Positive for chest pain. Negative for palpitations and leg swelling.   Gastrointestinal: Negative for abdominal distention, abdominal pain, constipation, diarrhea, nausea and vomiting.   Genitourinary: Negative for difficulty urinating, dysuria, frequency and urgency.   Musculoskeletal: Negative for arthralgias, back pain, myalgias, neck pain and neck stiffness.   Skin: Negative for pallor, rash and wound.   Neurological: Negative for dizziness, syncope, weakness, light-headedness, numbness and headaches.   Hematological: Does not bruise/bleed easily.   All other systems reviewed and are negative.     Physical Exam     Initial Vitals [10/25/20 1114]   BP Pulse Resp Temp SpO2   117/76 (!) 133 (!) 22 98.6 °F (37 °C) 97 %      MAP       --          Physical Exam  Nursing Notes and Vital Signs Reviewed.  Constitutional: Patient is in no acute distress. Well-developed and well-nourished.  Head: Atraumatic. Normocephalic.  Eyes: PERRL. EOM intact. Conjunctivae are not pale. No scleral icterus.  ENT: Mucous membranes are moist. Oropharynx is clear and symmetric.    Neck: Supple. Full ROM. No lymphadenopathy.  Cardiovascular: Regular rate. Regular rhythm. No murmurs, rubs, or gallops. Distal pulses are 2+ and symmetric.  Pulmonary/Chest: No respiratory distress. L sided basilar crackles.  Abdominal: Soft and non-distended.  There is no tenderness.  No rebound, guarding, or rigidity. Good bowel sounds.  Genitourinary: No CVA tenderness  Musculoskeletal: Moves all extremities. No obvious deformities. No edema. No calf tenderness.  Skin: Warm and dry.  Neurological:  Alert, awake, and appropriate.  Normal speech.  No acute focal neurological deficits are appreciated.  Psychiatric: Normal affect. Good eye contact. Appropriate in  content.     ED Course   Critical Care    Date/Time: 10/25/2020 4:17 PM  Performed by: Myah Siegel MD  Authorized by: Myah Siegel MD   Direct patient critical care time: 11 minutes  Additional history critical care time: 7 minutes  Ordering / reviewing critical care time: 6 minutes  Documentation critical care time: 7 minutes  Consulting other physicians critical care time: 5 minutes  Consult with family critical care time: 2 minutes  Total critical care time (exclusive of procedural time) : 38 minutes  Critical care time was exclusive of separately billable procedures and treating other patients and teaching time.  Critical care was necessary to treat or prevent imminent or life-threatening deterioration of the following conditions: sepsis.  Critical care was time spent personally by me on the following activities: blood draw for specimens, development of treatment plan with patient or surrogate, discussions with consultants, interpretation of cardiac output measurements, evaluation of patient's response to treatment, examination of patient, obtaining history from patient or surrogate, ordering and performing treatments and interventions, ordering and review of laboratory studies, ordering and review of radiographic studies, pulse oximetry, re-evaluation of patient's condition and review of old charts.        ED Vital Signs:  Vitals:    10/25/20 1114 10/25/20 1137 10/25/20 1202 10/25/20 1540   BP: 117/76  103/78 136/89   Pulse: (!) 133 (!) 116 (!) 117 107   Resp: (!) 22  20 20   Temp: 98.6 °F (37 °C)      TempSrc: Oral      SpO2: 97%  98% 98%   Weight: 90.4 kg (199 lb 4.7 oz)          Abnormal Lab Results:  Labs Reviewed   CBC W/ AUTO DIFFERENTIAL - Abnormal; Notable for the following components:       Result Value    WBC 20.61 (*)     RBC 3.78 (*)     Hemoglobin 13.2 (*)     Hematocrit 38.8 (*)     Mean Corpuscular Volume 103 (*)     Mean Corpuscular Hemoglobin 34.9 (*)     Platelets 407 (*)     Immature  Granulocytes 0.9 (*)     Gran # (ANC) 17.7 (*)     Immature Grans (Abs) 0.19 (*)     Mono # 1.5 (*)     Gran% 85.9 (*)     Lymph% 5.9 (*)     All other components within normal limits   COMPREHENSIVE METABOLIC PANEL - Abnormal; Notable for the following components:    Potassium 3.3 (*)     CO2 21 (*)     Glucose 144 (*)     Albumin 3.4 (*)     Total Bilirubin 1.5 (*)     eGFR if non  56 (*)     All other components within normal limits   TROPONIN I - Abnormal; Notable for the following components:    Troponin I 0.028 (*)     All other components within normal limits   D DIMER, QUANTITATIVE - Abnormal; Notable for the following components:    D-Dimer 2.57 (*)     All other components within normal limits   INFLUENZA A & B BY MOLECULAR   CULTURE, BLOOD   CULTURE, BLOOD   HIV 1 / 2 ANTIBODY   HEPATITIS C ANTIBODY   LACTIC ACID, PLASMA   B-TYPE NATRIURETIC PEPTIDE   SARS-COV-2 RNA AMPLIFICATION, QUAL   URINALYSIS, REFLEX TO URINE CULTURE        All Lab Results:  Results for orders placed or performed during the hospital encounter of 10/25/20   Influenza A & B by Molecular    Specimen: Nasopharyngeal Swab   Result Value Ref Range    Influenza A, Molecular Negative Negative    Influenza B, Molecular Negative Negative    Flu A & B Source Nasal swab    HIV 1/2 Ag/Ab (4th Gen)   Result Value Ref Range    HIV 1/2 Ag/Ab Negative Negative   Hepatitis C antibody   Result Value Ref Range    Hepatitis C Ab Negative Negative   CBC auto differential   Result Value Ref Range    WBC 20.61 (H) 3.90 - 12.70 K/uL    RBC 3.78 (L) 4.60 - 6.20 M/uL    Hemoglobin 13.2 (L) 14.0 - 18.0 g/dL    Hematocrit 38.8 (L) 40.0 - 54.0 %    Mean Corpuscular Volume 103 (H) 82 - 98 fL    Mean Corpuscular Hemoglobin 34.9 (H) 27.0 - 31.0 pg    Mean Corpuscular Hemoglobin Conc 34.0 32.0 - 36.0 g/dL    RDW 13.0 11.5 - 14.5 %    Platelets 407 (H) 150 - 350 K/uL    MPV 9.7 9.2 - 12.9 fL    Immature Granulocytes 0.9 (H) 0.0 - 0.5 %    Gran # (ANC)  17.7 (H) 1.8 - 7.7 K/uL    Immature Grans (Abs) 0.19 (H) 0.00 - 0.04 K/uL    Lymph # 1.2 1.0 - 4.8 K/uL    Mono # 1.5 (H) 0.3 - 1.0 K/uL    Eos # 0.0 0.0 - 0.5 K/uL    Baso # 0.03 0.00 - 0.20 K/uL    nRBC 0 0 /100 WBC    Gran% 85.9 (H) 38.0 - 73.0 %    Lymph% 5.9 (L) 18.0 - 48.0 %    Mono% 7.2 4.0 - 15.0 %    Eosinophil% 0.0 0.0 - 8.0 %    Basophil% 0.1 0.0 - 1.9 %    Differential Method Automated    Comprehensive metabolic panel   Result Value Ref Range    Sodium 138 136 - 145 mmol/L    Potassium 3.3 (L) 3.5 - 5.1 mmol/L    Chloride 102 95 - 110 mmol/L    CO2 21 (L) 23 - 29 mmol/L    Glucose 144 (H) 70 - 110 mg/dL    BUN, Bld 15 6 - 20 mg/dL    Creatinine 1.4 0.5 - 1.4 mg/dL    Calcium 9.7 8.7 - 10.5 mg/dL    Total Protein 8.3 6.0 - 8.4 g/dL    Albumin 3.4 (L) 3.5 - 5.2 g/dL    Total Bilirubin 1.5 (H) 0.1 - 1.0 mg/dL    Alkaline Phosphatase 121 55 - 135 U/L    AST 20 10 - 40 U/L    ALT 17 10 - 44 U/L    Anion Gap 15 8 - 16 mmol/L    eGFR if African American >60 >60 mL/min/1.73 m^2    eGFR if non African American 56 (A) >60 mL/min/1.73 m^2   Lactic acid, plasma #1   Result Value Ref Range    Lactate (Lactic Acid) 0.9 0.5 - 2.2 mmol/L   Troponin I   Result Value Ref Range    Troponin I 0.028 (H) 0.000 - 0.026 ng/mL   Brain natriuretic peptide   Result Value Ref Range    BNP 22 0 - 99 pg/mL   D dimer, quantitative   Result Value Ref Range    D-Dimer 2.57 (H) <0.50 mg/L FEU   COVID-19 Rapid Screening   Result Value Ref Range    SARS-CoV-2 RNA, Amplification, Qual Negative Negative       Imaging Results:  Imaging Results           CTA Chest Non-Coronary - PE Study (Final result)  Result time 10/25/20 16:07:04    Final result by Bairon Regalado MD (10/25/20 16:07:04)                 Impression:      No pulmonary thromboembolism.    Left basilar consolidation with air bronchograms concerning for pneumonia.    This report was flagged in Epic as abnormal.    All CT scans at this facility are performed  using dose  modulation techniques as appropriate to performed exam including the following:  automated exposure control; adjustment of mA and/or kV according to the patients size (this includes techniques or standardized protocols for targeted exams where dose is matched to indication/reason for exam: i.e. extremities or head);  iterative reconstruction technique.      Electronically signed by: Bairon Regalado  Date:    10/25/2020  Time:    16:07             Narrative:    EXAMINATION:  CTA CHEST NON CORONARY    CLINICAL HISTORY:  PE suspected, intermediate prob, positive D-dimer;    TECHNIQUE:  Low dose axial images, sagittal and coronal reformations were obtained from the thoracic inlet to the lung bases following the IV administration of 100 mL of Omnipaque 350.  Contrast timing was optimized to evaluate the pulmonary arteries.  MIP images were performed.    COMPARISON:  10/25/2020.    FINDINGS:  Base of Neck: No significant abnormality.    Thoracic soft tissues: Unremarkable.    Aorta: Left-sided aortic arch.  No aneurysm and no significant atherosclerosis    Heart: Normal size. No effusion.    Pulmonary vasculature: Pulmonary arteries are well opacified.  There is no pulmonary thromboembolism.    Brandy/Mediastinum: No pathologic arben enlargement.    Airways: Patent.    Lungs/Pleura: Left basilar consolidation with air bronchograms concerning for pneumonia.  No associated pulmonary thromboembolism in this area or elsewhere.  No pleural fluid.  No pneumothorax.    Esophagus: Unremarkable.    Upper Abdomen: No abnormality of the partially imaged upper abdomen.    Bones: No acute fracture. No suspicious lytic or sclerotic lesions.                               X-Ray Chest AP Portable (Final result)  Result time 10/25/20 12:17:46    Final result by Srini Mackenzie III, MD (10/25/20 12:17:46)                 Impression:      Left basilar infiltrate and/or atelectasis.  Follow-up recommended to document  resolution.      Electronically signed by: Srini Mackenzie MD  Date:    10/25/2020  Time:    12:17             Narrative:    EXAMINATION:  XR CHEST AP PORTABLE    CLINICAL HISTORY:  Sepsis;    COMPARISON:  Two thousand fifteen    FINDINGS:  The heart size remains normal with mild tortuosity of the thoracic aorta.  The right lung is clear.  There is minimal left basilar infiltrate or atelectatic change.  Left mid to upper lung field is clear.                                 The EKG was ordered, reviewed, and independently interpreted by the ED provider.  Interpretation time: 11:17  Rate: 134 BPM  Rhythm: sinus tachycardia  Interpretation: Minimal voltage criteria for LVH. No STEMI.           The Emergency Provider reviewed the vital signs and test results, which are outlined above.     ED Discussion     4:00 PM: Dr. Rosa transfers care of patient to Dr. Siegel pending imaging results.    4:18 PM: 30mL/kg IVF have been administered within 3 hours of identification of SIRS criteria. Vital signs were reviewed and a focal sepsis perfusion assessment was performed.    4:18 PM: Re-evaluated pt. I have discussed test results, shared treatment plan, and the need for admission with patient and family at bedside. Pt and family express understanding at this time and agree with all information. All questions answered. Pt and family have no further questions or concerns at this time. Pt is ready for admit.    4:18 PM: Discussed case with KANU Durand ([facility]). Dr. Philippe agrees with current care and management of pt and accepts admission.   Admitting Service: Hospital Medicine  Admitting Physician: Dr. Philippe   Admit to: obs/tele              Medical Decision Making:   Clinical Tests:   Lab Tests: Ordered and Reviewed  Radiological Study: Ordered and Reviewed  Medical Tests: Ordered and Reviewed           ED Medication(s):  Medications   methylPREDNISolone sodium succinate injection 125 mg (has no  administration in time range)   lactated ringers bolus 1,000 mL (0 mLs Intravenous Stopped 10/25/20 1322)   cefTRIAXone (ROCEPHIN) 2 g/50 mL D5W IVPB (0 g Intravenous Stopped 10/25/20 1504)   azithromycin 500 mg in dextrose 5 % 250 mL IVPB (ready to mix system) (0 mg Intravenous Stopped 10/25/20 1532)   aspirin tablet 325 mg (325 mg Oral Given 10/25/20 1623)   iohexoL (OMNIPAQUE 350) injection 100 mL (100 mLs Intravenous Given 10/25/20 1554)       New Prescriptions    No medications on file               Scribe Attestation:   Scribe #1: I performed the above scribed service and the documentation accurately describes the services I performed. I attest to the accuracy of the note.     Attending:   Physician Attestation Statement for Scribe #1: I, Velasquez Rosa MD, personally performed the services described in this documentation, as scribed by Yvonne Justice, in my presence, and it is both accurate and complete.       Scribe Attestation:   Scribe #2: I performed the above scribed service and the documentation accurately describes the services I performed. I attest to the accuracy of the note.    Attending Attestation:           Physician Attestation for Scribe:    Physician Attestation Statement for Scribe #2: I, Myah Siegel MD, reviewed documentation, as scribed by Eli Irwin in my presence, and it is both accurate and complete. I also acknowledge and confirm the content of the note done by Scribe #1.           Clinical Impression       ICD-10-CM ICD-9-CM   1. Pneumonia of left lower lobe due to infectious organism  J18.9 486   2. Rapid heart beat  R00.0 785.0   3. Chest pain  R07.9 786.50   4. Sepsis, due to unspecified organism, unspecified whether acute organ dysfunction present  A41.9 038.9     995.91   5. Elevated troponin  R77.8 790.6       Disposition:   Disposition: Placed in Observation  Condition: Stable    Portions of this note may have been created with voice recognition software. Occasional  ""wrong-word" or "sound-a-like" substitutions may have occurred due to the inherent limitations of voice recognition software. Please, read the note carefully and recognize, using context, where substitutions have occurred.          Myah Siegel MD  11/12/20 0311    "

## 2020-10-25 NOTE — ASSESSMENT & PLAN NOTE
-in the setting of PNA   -serial results in progress- initial result 0.028   -will monitor   -will consider need for Echo and Cardiology consult pending clinical course

## 2020-10-25 NOTE — ASSESSMENT & PLAN NOTE
-D-dimer 2.57  -CTA of chest showed no pulmonary thromboembolism with left basilar consolidation with air bronchograms concerning for pneumonia.   -US of bilateral lower extremities pending

## 2020-10-26 ENCOUNTER — NURSE TRIAGE (OUTPATIENT)
Dept: ADMINISTRATIVE | Facility: CLINIC | Age: 55
End: 2020-10-26

## 2020-10-26 VITALS
HEIGHT: 78 IN | HEART RATE: 94 BPM | SYSTOLIC BLOOD PRESSURE: 144 MMHG | BODY MASS INDEX: 22.8 KG/M2 | RESPIRATION RATE: 18 BRPM | WEIGHT: 197.06 LBS | TEMPERATURE: 98 F | DIASTOLIC BLOOD PRESSURE: 88 MMHG | OXYGEN SATURATION: 96 %

## 2020-10-26 PROBLEM — R07.9 CHEST PAIN: Status: RESOLVED | Noted: 2020-10-25 | Resolved: 2020-10-26

## 2020-10-26 PROBLEM — R79.89 ELEVATED D-DIMER: Status: RESOLVED | Noted: 2020-10-25 | Resolved: 2020-10-26

## 2020-10-26 PROBLEM — E87.6 HYPOKALEMIA: Status: RESOLVED | Noted: 2020-10-25 | Resolved: 2020-10-26

## 2020-10-26 PROBLEM — R79.89 ELEVATED TROPONIN: Status: RESOLVED | Noted: 2020-10-25 | Resolved: 2020-10-26

## 2020-10-26 PROBLEM — A41.9 SEPSIS: Status: RESOLVED | Noted: 2020-10-25 | Resolved: 2020-10-26

## 2020-10-26 LAB
ALBUMIN SERPL BCP-MCNC: 3 G/DL (ref 3.5–5.2)
ALP SERPL-CCNC: 103 U/L (ref 55–135)
ALT SERPL W/O P-5'-P-CCNC: 13 U/L (ref 10–44)
ANION GAP SERPL CALC-SCNC: 8 MMOL/L (ref 8–16)
AST SERPL-CCNC: 17 U/L (ref 10–40)
BASOPHILS # BLD AUTO: 0.01 K/UL (ref 0–0.2)
BASOPHILS NFR BLD: 0.1 % (ref 0–1.9)
BILIRUB SERPL-MCNC: 0.7 MG/DL (ref 0.1–1)
BUN SERPL-MCNC: 16 MG/DL (ref 6–20)
CALCIUM SERPL-MCNC: 9.6 MG/DL (ref 8.7–10.5)
CHLORIDE SERPL-SCNC: 103 MMOL/L (ref 95–110)
CO2 SERPL-SCNC: 25 MMOL/L (ref 23–29)
CREAT SERPL-MCNC: 1 MG/DL (ref 0.5–1.4)
DIFFERENTIAL METHOD: ABNORMAL
EOSINOPHIL # BLD AUTO: 0 K/UL (ref 0–0.5)
EOSINOPHIL NFR BLD: 0 % (ref 0–8)
ERYTHROCYTE [DISTWIDTH] IN BLOOD BY AUTOMATED COUNT: 12.9 % (ref 11.5–14.5)
EST. GFR  (AFRICAN AMERICAN): >60 ML/MIN/1.73 M^2
EST. GFR  (NON AFRICAN AMERICAN): >60 ML/MIN/1.73 M^2
GLUCOSE SERPL-MCNC: 182 MG/DL (ref 70–110)
HCT VFR BLD AUTO: 39.5 % (ref 40–54)
HGB BLD-MCNC: 12.6 G/DL (ref 14–18)
IMM GRANULOCYTES # BLD AUTO: 0.08 K/UL (ref 0–0.04)
IMM GRANULOCYTES NFR BLD AUTO: 0.5 % (ref 0–0.5)
LYMPHOCYTES # BLD AUTO: 0.6 K/UL (ref 1–4.8)
LYMPHOCYTES NFR BLD: 3.2 % (ref 18–48)
MCH RBC QN AUTO: 33.1 PG (ref 27–31)
MCHC RBC AUTO-ENTMCNC: 31.9 G/DL (ref 32–36)
MCV RBC AUTO: 104 FL (ref 82–98)
MONOCYTES # BLD AUTO: 0.3 K/UL (ref 0.3–1)
MONOCYTES NFR BLD: 1.6 % (ref 4–15)
NEUTROPHILS # BLD AUTO: 16.3 K/UL (ref 1.8–7.7)
NEUTROPHILS NFR BLD: 94.6 % (ref 38–73)
NRBC BLD-RTO: 0 /100 WBC
PLATELET # BLD AUTO: 434 K/UL (ref 150–350)
PMV BLD AUTO: 9.2 FL (ref 9.2–12.9)
POTASSIUM SERPL-SCNC: 3.8 MMOL/L (ref 3.5–5.1)
PROCALCITONIN SERPL IA-MCNC: 0.16 NG/ML
PROT SERPL-MCNC: 8 G/DL (ref 6–8.4)
RBC # BLD AUTO: 3.81 M/UL (ref 4.6–6.2)
SODIUM SERPL-SCNC: 136 MMOL/L (ref 136–145)
TROPONIN I SERPL DL<=0.01 NG/ML-MCNC: 0.02 NG/ML (ref 0–0.03)
WBC # BLD AUTO: 17.27 K/UL (ref 3.9–12.7)

## 2020-10-26 PROCEDURE — G0378 HOSPITAL OBSERVATION PER HR: HCPCS

## 2020-10-26 PROCEDURE — 94799 UNLISTED PULMONARY SVC/PX: CPT

## 2020-10-26 PROCEDURE — S4991 NICOTINE PATCH NONLEGEND: HCPCS | Performed by: NURSE PRACTITIONER

## 2020-10-26 PROCEDURE — 63600175 PHARM REV CODE 636 W HCPCS: Performed by: NURSE PRACTITIONER

## 2020-10-26 PROCEDURE — 84145 PROCALCITONIN (PCT): CPT

## 2020-10-26 PROCEDURE — 25000003 PHARM REV CODE 250: Performed by: NURSE PRACTITIONER

## 2020-10-26 PROCEDURE — 36415 COLL VENOUS BLD VENIPUNCTURE: CPT

## 2020-10-26 PROCEDURE — 63600175 PHARM REV CODE 636 W HCPCS: Performed by: INTERNAL MEDICINE

## 2020-10-26 PROCEDURE — 94761 N-INVAS EAR/PLS OXIMETRY MLT: CPT

## 2020-10-26 PROCEDURE — 80053 COMPREHEN METABOLIC PANEL: CPT

## 2020-10-26 PROCEDURE — 96376 TX/PRO/DX INJ SAME DRUG ADON: CPT

## 2020-10-26 PROCEDURE — 85025 COMPLETE CBC W/AUTO DIFF WBC: CPT

## 2020-10-26 RX ORDER — LEVOFLOXACIN 750 MG/1
750 TABLET ORAL DAILY
Qty: 7 TABLET | Refills: 0 | Status: SHIPPED | OUTPATIENT
Start: 2020-10-26 | End: 2020-11-02

## 2020-10-26 RX ADMIN — AZITHROMYCIN MONOHYDRATE 500 MG: 500 INJECTION, POWDER, LYOPHILIZED, FOR SOLUTION INTRAVENOUS at 02:10

## 2020-10-26 RX ADMIN — CEFTRIAXONE 1 G: 1 INJECTION, SOLUTION INTRAVENOUS at 01:10

## 2020-10-26 RX ADMIN — NICOTINE 1 PATCH: 7 PATCH TRANSDERMAL at 09:10

## 2020-10-26 RX ADMIN — METHYLPREDNISOLONE SODIUM SUCCINATE 80 MG: 125 INJECTION, POWDER, FOR SOLUTION INTRAMUSCULAR; INTRAVENOUS at 05:10

## 2020-10-26 RX ADMIN — ASPIRIN 81 MG CHEWABLE TABLET 81 MG: 81 TABLET CHEWABLE at 09:10

## 2020-10-26 NOTE — NURSING
Specimen cup give to pt for sputum culture and instructed on obtaining specimen. Pt v/u. Pt alos given IS and given verbal instructions of device. Pt v/u.

## 2020-10-26 NOTE — PLAN OF CARE
CM met with patient/spouse (Leidy) at the bedside to assess for discharge needs.  Patient lives at home with Leidy and his 2 children.  He is independent with all needs and manages his own healthcare.  CM provided patient with multiple resources for [facility] clinics.  CM also provided information on [facility].  Patient stated that he applied for Medicaid about 3 months ago but has not heard or received a card from Medicaid.  CM sent a secure chat to [facility] to meet with patient.  No anticipated discharge needs at this time.  CM provided a transitional care folder, information on advanced directives, information on pharmacy bedside delivery, and discharge planning begins on admission with contact information for any needs/questions.     D/C Plan: Home, no needs  PCP: [facility] provided  Preferred [facility]  Discharge transportation: Leidy  My [facility]:   Pharmacy Bedside Delivery: Yes     10/26/20 0952   Discharge Assessment   Assessment Type Discharge Planning Assessment   Confirmed/corrected address and phone number on facesheet? Yes   Assessment information obtained from? Patient;Medical Record;Caregiver   Expected Length of Stay (days)   (1-2)   Communicated expected length of stay with patient/caregiver yes   Prior to hospitilization cognitive status: Alert/Oriented   Prior to hospitalization functional status: Independent   Current cognitive status: Alert/Oriented   Current Functional Status: Independent   Facility Arrived From: Home   Lives With spouse   Able to Return to Prior Arrangements yes   Is patient able to care for self after discharge? Yes   Who are your caregiver(s) and their phone number(s)? Leidy Amaya, spouse [phone]   Patient's perception of discharge disposition home or selfcare   Patient currently being followed by outpatient case management? No   Patient currently receives any other outside agency services? No   Equipment Currently  Used at Home none   Do you have any problems affording any of your prescribed medications? TBD   Is the patient taking medications as prescribed?   (not currently taking prescription meds)   Does the patient have transportation home? Yes   Transportation Anticipated family or friend will provide   Dialysis Name and Scheduled days NA   Does the patient receive services at the Coumadin Clinic? No   Discharge Plan A Home with family   Discharge Plan B Home with family   DME Needed Upon Discharge  none   Patient/Family in Agreement with Plan yes

## 2020-10-26 NOTE — PLAN OF CARE
Pt remains fall free this shift.  Pt AAOx4, verbal, clear speech.  Skin warm and dry. No new skin issues.  Telemonitoring in progress, 80s-100s SR-ST  Room air  Urinal  Independent with transfers.  Bed low, side rails up x 2, wheels locked, call light in reach.   Bed alarm maintained for safety.   Patient instructed to call for assistance.   Hourly rounding completed.   24 hour chart check completed  POC updated and reviewed with pt. Will continue POC.

## 2020-10-26 NOTE — PROGRESS NOTES
IV removed without difficulty, pressure dressing applied to site - cardiac monitor removed and returned to monitor tech - patient to clean up and call for his ride to pick him up.

## 2020-10-26 NOTE — PROGRESS NOTES
AVS reviewed with pt at this time   He verbalized understanding of his medications and to call and establish a PCP visit. He denies help with arrangement.   PIV removed by primary RN   Tele monitor removed and returned by primary RN   Pt waiting on a ride at this time    [employer]

## 2020-10-27 NOTE — PLAN OF CARE
Patient provided with community clinic resources.  Patient plan to arrange an appointment to establish care at one of the community clinics     10/27/20 1528   Final Note   Assessment Type Final Discharge Note   Anticipated Discharge Disposition Home   Right Care Referral Info   Post Acute Recommendation No Care

## 2020-10-27 NOTE — TELEPHONE ENCOUNTER
patient was discharged form hospital and was suppose to have Naproxen, Orphenadrine  And Tramadol. medications not at pharmacy. Will tag all providers possibly aware of medication for patient. Mediations on discharge paperwork but I dont see where they were ordered.     Reason for Disposition   Caller requesting a CONTROLLED substance prescription refill (e.g., narcotics, ADHD medicines)    Protocols used: MEDICATION QUESTION CALL-A-AH

## 2020-10-30 LAB
BACTERIA BLD CULT: NORMAL
BACTERIA BLD CULT: NORMAL

## 2020-11-01 NOTE — HOSPITAL COURSE
56 y/o aam admitted with a dx of LLL PNA .  He was started on IVAB with an improvement of his sx .  Pt was seen and examined at bedside . He was determined to be suitable for d/c .  He is on room air with a o2sat > 96 % . He will be d/c on po Levaquin . He is afebrile and WBC is trending to down .
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
## 2022-09-02 ENCOUNTER — HOSPITAL ENCOUNTER (INPATIENT)
Facility: HOSPITAL | Age: 57
LOS: 1 days | Discharge: HOME | DRG: 206 | End: 2022-09-03
Attending: EMERGENCY MEDICINE | Admitting: INTERNAL MEDICINE
Payer: COMMERCIAL

## 2022-09-02 DIAGNOSIS — R91.8 LUNG MASS: Primary | ICD-10-CM

## 2022-09-02 DIAGNOSIS — I16.0 HYPERTENSIVE URGENCY: ICD-10-CM

## 2022-09-02 DIAGNOSIS — R07.9 CHEST PAIN: ICD-10-CM

## 2022-09-02 DIAGNOSIS — R91.8 MASS OF RIGHT LUNG: ICD-10-CM

## 2022-09-02 PROBLEM — D75.839 THROMBOCYTOSIS: Status: ACTIVE | Noted: 2022-09-02

## 2022-09-02 PROBLEM — F17.200 NICOTINE DEPENDENCE: Status: ACTIVE | Noted: 2020-10-25

## 2022-09-02 PROBLEM — E88.09 HYPOALBUMINEMIA: Status: ACTIVE | Noted: 2022-09-02

## 2022-09-02 PROBLEM — I25.10 CAD (CORONARY ARTERY DISEASE): Status: ACTIVE | Noted: 2022-09-02

## 2022-09-02 PROBLEM — I10 HYPERTENSION: Status: ACTIVE | Noted: 2022-09-02

## 2022-09-02 LAB
ALBUMIN SERPL BCP-MCNC: 3.2 G/DL (ref 3.5–5.2)
ALP SERPL-CCNC: 138 U/L (ref 55–135)
ALT SERPL W/O P-5'-P-CCNC: 8 U/L (ref 10–44)
AMPHET+METHAMPHET UR QL: NEGATIVE
ANION GAP SERPL CALC-SCNC: 9 MMOL/L (ref 8–16)
AST SERPL-CCNC: 13 U/L (ref 10–40)
BARBITURATES UR QL SCN>200 NG/ML: NEGATIVE
BASOPHILS # BLD AUTO: 0.04 K/UL (ref 0–0.2)
BASOPHILS NFR BLD: 0.5 % (ref 0–1.9)
BENZODIAZ UR QL SCN>200 NG/ML: NEGATIVE
BILIRUB SERPL-MCNC: 0.9 MG/DL (ref 0.1–1)
BUN SERPL-MCNC: 12 MG/DL (ref 6–20)
BZE UR QL SCN: NEGATIVE
CALCIUM SERPL-MCNC: 10.2 MG/DL (ref 8.7–10.5)
CANNABINOIDS UR QL SCN: NEGATIVE
CHLORIDE SERPL-SCNC: 104 MMOL/L (ref 95–110)
CK SERPL-CCNC: 105 U/L (ref 20–200)
CO2 SERPL-SCNC: 27 MMOL/L (ref 23–29)
CREAT SERPL-MCNC: 1.2 MG/DL (ref 0.5–1.4)
CREAT UR-MCNC: 352.2 MG/DL (ref 23–375)
DIFFERENTIAL METHOD: ABNORMAL
EOSINOPHIL # BLD AUTO: 0.1 K/UL (ref 0–0.5)
EOSINOPHIL NFR BLD: 1.8 % (ref 0–8)
ERYTHROCYTE [DISTWIDTH] IN BLOOD BY AUTOMATED COUNT: 13.9 % (ref 11.5–14.5)
EST. GFR  (NO RACE VARIABLE): >60 ML/MIN/1.73 M^2
GLUCOSE SERPL-MCNC: 112 MG/DL (ref 70–110)
HCT VFR BLD AUTO: 42.1 % (ref 40–54)
HGB BLD-MCNC: 13.8 G/DL (ref 14–18)
HIV1+2 IGG SERPL QL IA.RAPID: NORMAL
IMM GRANULOCYTES # BLD AUTO: 0.01 K/UL (ref 0–0.04)
IMM GRANULOCYTES NFR BLD AUTO: 0.1 % (ref 0–0.5)
LYMPHOCYTES # BLD AUTO: 1.3 K/UL (ref 1–4.8)
LYMPHOCYTES NFR BLD: 15.9 % (ref 18–48)
MCH RBC QN AUTO: 33.1 PG (ref 27–31)
MCHC RBC AUTO-ENTMCNC: 32.8 G/DL (ref 32–36)
MCV RBC AUTO: 101 FL (ref 82–98)
METHADONE UR QL SCN>300 NG/ML: NEGATIVE
MONOCYTES # BLD AUTO: 0.8 K/UL (ref 0.3–1)
MONOCYTES NFR BLD: 10.3 % (ref 4–15)
NEUTROPHILS # BLD AUTO: 5.6 K/UL (ref 1.8–7.7)
NEUTROPHILS NFR BLD: 71.4 % (ref 38–73)
NRBC BLD-RTO: 0 /100 WBC
OPIATES UR QL SCN: NEGATIVE
PCP UR QL SCN>25 NG/ML: NEGATIVE
PLATELET # BLD AUTO: 586 K/UL (ref 150–450)
PMV BLD AUTO: 8.4 FL (ref 9.2–12.9)
POTASSIUM SERPL-SCNC: 3.6 MMOL/L (ref 3.5–5.1)
PROT SERPL-MCNC: 8.4 G/DL (ref 6–8.4)
RBC # BLD AUTO: 4.17 M/UL (ref 4.6–6.2)
SARS-COV-2 RDRP RESP QL NAA+PROBE: NEGATIVE
SODIUM SERPL-SCNC: 140 MMOL/L (ref 136–145)
TOXICOLOGY INFORMATION: NORMAL
TROPONIN I SERPL DL<=0.01 NG/ML-MCNC: 0.01 NG/ML (ref 0–0.03)
WBC # BLD AUTO: 7.84 K/UL (ref 3.9–12.7)

## 2022-09-02 PROCEDURE — U0002 COVID-19 LAB TEST NON-CDC: HCPCS | Performed by: EMERGENCY MEDICINE

## 2022-09-02 PROCEDURE — 99223 PR INITIAL HOSPITAL CARE,LEVL III: ICD-10-PCS | Mod: ,,, | Performed by: INTERNAL MEDICINE

## 2022-09-02 PROCEDURE — 93010 ELECTROCARDIOGRAM REPORT: CPT | Mod: ,,, | Performed by: INTERNAL MEDICINE

## 2022-09-02 PROCEDURE — 93010 EKG 12-LEAD: ICD-10-PCS | Mod: ,,, | Performed by: INTERNAL MEDICINE

## 2022-09-02 PROCEDURE — 96374 THER/PROPH/DIAG INJ IV PUSH: CPT

## 2022-09-02 PROCEDURE — 99223 1ST HOSP IP/OBS HIGH 75: CPT | Mod: ,,, | Performed by: INTERNAL MEDICINE

## 2022-09-02 PROCEDURE — 25000003 PHARM REV CODE 250: Performed by: EMERGENCY MEDICINE

## 2022-09-02 PROCEDURE — 63600175 PHARM REV CODE 636 W HCPCS: Performed by: EMERGENCY MEDICINE

## 2022-09-02 PROCEDURE — 21400001 HC TELEMETRY ROOM

## 2022-09-02 PROCEDURE — 25500020 PHARM REV CODE 255: Performed by: EMERGENCY MEDICINE

## 2022-09-02 PROCEDURE — 82550 ASSAY OF CK (CPK): CPT | Performed by: NURSE PRACTITIONER

## 2022-09-02 PROCEDURE — 25000003 PHARM REV CODE 250: Performed by: NURSE PRACTITIONER

## 2022-09-02 PROCEDURE — 80053 COMPREHEN METABOLIC PANEL: CPT | Performed by: NURSE PRACTITIONER

## 2022-09-02 PROCEDURE — 80307 DRUG TEST PRSMV CHEM ANLYZR: CPT | Performed by: NURSE PRACTITIONER

## 2022-09-02 PROCEDURE — 84484 ASSAY OF TROPONIN QUANT: CPT | Performed by: NURSE PRACTITIONER

## 2022-09-02 PROCEDURE — 85025 COMPLETE CBC W/AUTO DIFF WBC: CPT | Performed by: NURSE PRACTITIONER

## 2022-09-02 PROCEDURE — 86703 HIV-1/HIV-2 1 RESULT ANTBDY: CPT | Performed by: NURSE PRACTITIONER

## 2022-09-02 PROCEDURE — 99291 CRITICAL CARE FIRST HOUR: CPT | Mod: 25

## 2022-09-02 PROCEDURE — 93005 ELECTROCARDIOGRAM TRACING: CPT

## 2022-09-02 RX ORDER — LISINOPRIL 20 MG/1
20 TABLET ORAL DAILY
Status: DISCONTINUED | OUTPATIENT
Start: 2022-09-02 | End: 2022-09-03 | Stop reason: HOSPADM

## 2022-09-02 RX ORDER — NICARDIPINE HYDROCHLORIDE 0.2 MG/ML
0-15 INJECTION INTRAVENOUS CONTINUOUS
Status: DISCONTINUED | OUTPATIENT
Start: 2022-09-02 | End: 2022-09-02

## 2022-09-02 RX ORDER — HYDRALAZINE HYDROCHLORIDE 20 MG/ML
20 INJECTION INTRAMUSCULAR; INTRAVENOUS
Status: DISCONTINUED | OUTPATIENT
Start: 2022-09-02 | End: 2022-09-02

## 2022-09-02 RX ORDER — HYDRALAZINE HYDROCHLORIDE 20 MG/ML
10 INJECTION INTRAMUSCULAR; INTRAVENOUS EVERY 6 HOURS PRN
Status: DISCONTINUED | OUTPATIENT
Start: 2022-09-02 | End: 2022-09-03 | Stop reason: HOSPADM

## 2022-09-02 RX ORDER — POLYETHYLENE GLYCOL 3350 17 G/17G
17 POWDER, FOR SOLUTION ORAL 2 TIMES DAILY PRN
Status: DISCONTINUED | OUTPATIENT
Start: 2022-09-02 | End: 2022-09-03 | Stop reason: HOSPADM

## 2022-09-02 RX ORDER — SODIUM CHLORIDE 0.9 % (FLUSH) 0.9 %
10 SYRINGE (ML) INJECTION EVERY 12 HOURS PRN
Status: DISCONTINUED | OUTPATIENT
Start: 2022-09-02 | End: 2022-09-03 | Stop reason: HOSPADM

## 2022-09-02 RX ORDER — SIMETHICONE 80 MG
1 TABLET,CHEWABLE ORAL 4 TIMES DAILY PRN
Status: DISCONTINUED | OUTPATIENT
Start: 2022-09-02 | End: 2022-09-03 | Stop reason: HOSPADM

## 2022-09-02 RX ORDER — POTASSIUM CHLORIDE 20 MEQ/1
40 TABLET, EXTENDED RELEASE ORAL ONCE
Status: COMPLETED | OUTPATIENT
Start: 2022-09-02 | End: 2022-09-02

## 2022-09-02 RX ORDER — HYDRALAZINE HYDROCHLORIDE 20 MG/ML
10 INJECTION INTRAMUSCULAR; INTRAVENOUS
Status: COMPLETED | OUTPATIENT
Start: 2022-09-02 | End: 2022-09-02

## 2022-09-02 RX ORDER — ACETAMINOPHEN 325 MG/1
650 TABLET ORAL EVERY 4 HOURS PRN
Status: DISCONTINUED | OUTPATIENT
Start: 2022-09-02 | End: 2022-09-03 | Stop reason: HOSPADM

## 2022-09-02 RX ORDER — TALC
6 POWDER (GRAM) TOPICAL NIGHTLY PRN
Status: DISCONTINUED | OUTPATIENT
Start: 2022-09-02 | End: 2022-09-03 | Stop reason: HOSPADM

## 2022-09-02 RX ORDER — IBUPROFEN 200 MG
1 TABLET ORAL DAILY
Status: DISCONTINUED | OUTPATIENT
Start: 2022-09-03 | End: 2022-09-03 | Stop reason: HOSPADM

## 2022-09-02 RX ORDER — CLONIDINE HYDROCHLORIDE 0.2 MG/1
0.2 TABLET ORAL
Status: COMPLETED | OUTPATIENT
Start: 2022-09-02 | End: 2022-09-02

## 2022-09-02 RX ORDER — MAG HYDROX/ALUMINUM HYD/SIMETH 200-200-20
30 SUSPENSION, ORAL (FINAL DOSE FORM) ORAL 4 TIMES DAILY PRN
Status: DISCONTINUED | OUTPATIENT
Start: 2022-09-02 | End: 2022-09-03 | Stop reason: HOSPADM

## 2022-09-02 RX ORDER — ONDANSETRON 2 MG/ML
4 INJECTION INTRAMUSCULAR; INTRAVENOUS EVERY 6 HOURS PRN
Status: DISCONTINUED | OUTPATIENT
Start: 2022-09-02 | End: 2022-09-03 | Stop reason: HOSPADM

## 2022-09-02 RX ORDER — NALOXONE HCL 0.4 MG/ML
0.02 VIAL (ML) INJECTION
Status: DISCONTINUED | OUTPATIENT
Start: 2022-09-02 | End: 2022-09-03 | Stop reason: HOSPADM

## 2022-09-02 RX ADMIN — IOHEXOL 100 ML: 350 INJECTION, SOLUTION INTRAVENOUS at 01:09

## 2022-09-02 RX ADMIN — POTASSIUM CHLORIDE 40 MEQ: 1500 TABLET, EXTENDED RELEASE ORAL at 06:09

## 2022-09-02 RX ADMIN — HYDRALAZINE HYDROCHLORIDE 10 MG: 20 INJECTION INTRAMUSCULAR; INTRAVENOUS at 02:09

## 2022-09-02 RX ADMIN — LISINOPRIL 20 MG: 20 TABLET ORAL at 05:09

## 2022-09-02 RX ADMIN — CLONIDINE HYDROCHLORIDE 0.2 MG: 0.2 TABLET ORAL at 03:09

## 2022-09-02 NOTE — CONSULTS
[facility] - Emergency Dept.  Pulmonology  Consult Note    Patient Name: Srinivasan Saucedo III  MRN: [id number]  Admission Date: 9/2/2022  Hospital Length of Stay: 0 days  Code Status: Prior  Attending Physician: Blanca Campoverde Do, MD  Primary Care Provider: Primary Doctor No   Principal Problem: <principal problem not specified>    [unfilled]  Subjective:     HPI:  Srinivasan Saucedo III is 57 y.o.  Asked to see for abn cxr and chest CT  Smoker  Never seen PCP  Presented for chest pain, BP elevated in ER  Imaging reviewed with patient  Location, Size, enlarge level 7 and paratracheal LN: concern for likely Squamous malignancy  No hemoptysis  EKG : NSR  Bronchscopy procedure described to patient        No past medical history on file.    No past surgical history on file.    Review of patient's allergies indicates:  No Known Allergies    Family History    None       Tobacco Use    Smoking status: Every Day     Packs/day: 1.00     Types: Cigarettes    Smokeless tobacco: Never   Substance and Sexual Activity    Alcohol use: Yes     Comment: occasional    Drug use: No    Sexual activity: Not on file         Review of Systems   Constitutional:  Positive for fatigue.   Respiratory:  Positive for chest tightness.    Cardiovascular:  Positive for chest pain.   Objective:     Vital Signs (Most Recent):  Temp: 98 °F (36.7 °C) (09/02/22 0956)  Pulse: 94 (09/02/22 1532)  Resp: (!) 31 (09/02/22 1532)  BP: (!) 190/117 (09/02/22 1532)  SpO2: 100 % (09/02/22 1532)   Vital Signs (24h Range):  Temp:  [98 °F (36.7 °C)] 98 °F (36.7 °C)  Pulse:  [72-99] 94  Resp:  [18-31] 31  SpO2:  [96 %-100 %] 100 %  BP: (160-211)/(101-120) 190/117     Weight: 86.7 kg (191 lb 4 oz)  Body mass index is 22.1 kg/m².    No intake or output data in the 24 hours ending 09/02/22 1636    Physical Exam  Vitals and nursing note reviewed.   Constitutional:       Appearance: Normal appearance.   HENT:      Head: Normocephalic and atraumatic.      Mouth/Throat:      Mouth:  Mucous membranes are dry.   Eyes:      Pupils: Pupils are equal, round, and reactive to light.   Cardiovascular:      Rate and Rhythm: Normal rate and regular rhythm.      Pulses: Normal pulses.      Heart sounds: Normal heart sounds.   Pulmonary:      Effort: Pulmonary effort is normal.      Breath sounds: Normal breath sounds.   Musculoskeletal:         General: Normal range of motion.      Cervical back: Normal range of motion and neck supple.   Skin:     General: Skin is warm.   Neurological:      General: No focal deficit present.      Mental Status: He is alert.       Vents:       Lines/Drains/Airways       Peripheral Intravenous Line  Duration                  Peripheral IV - Single Lumen 09/02/22 1120 20 G Posterior;Right Hand <1 day                    Significant Labs:    CBC/Anemia Profile:  Recent Labs   Lab 09/02/22  1101   WBC 7.84   HGB 13.8*   HCT 42.1   *   *   RDW 13.9        Chemistries:  Recent Labs   Lab 09/02/22  1101      K 3.6      CO2 27   BUN 12   CREATININE 1.2   CALCIUM 10.2   ALBUMIN 3.2*   PROT 8.4   BILITOT 0.9   ALKPHOS 138*   ALT 8*   AST 13       ABGs: No results for input(s): PH, PCO2, HCO3, POCSATURATED, BE in the last 48 hours.  Cardiac Markers: No results for input(s): CKMB, TROPONINT, MYOGLOBIN in the last 48 hours.  Coagulation: No results for input(s): PT, INR, APTT in the last 48 hours.  POCT Glucose: No results for input(s): POCTGLUCOSE in the last 48 hours.  Troponin:   Recent Labs   Lab 09/02/22  1101   TROPONINI 0.008     All pertinent labs within the past 24 hours have been reviewed.    Significant Imaging:   I have reviewed all pertinent imaging results/findings within the past 24 hours.    CT Chest With Contrast  Narrative: EXAMINATION:  CT CHEST WITH CONTRAST    CLINICAL HISTORY:  Abnormal xray - lung nodule, >= 1 cm;    TECHNIQUE:  The chest was surveyed from the apices through the posterior costophrenic angles after administration of 100 cc  of IV contrast..  Data was reconstructed for multiplanar images in axial, sagittal and coronal planes in for maximal intensity projection images in the axial plane.    COMPARISON:  09/02/2022    FINDINGS:  Base of Neck: No significant abnormality.    Airways: Patent centrally.  Right upper lobe bronchus is occluded by mass.    Lungs: 5.4 x 5.7 cm mass suspected within the right upper lobe with areas of necrosis.  Mass extends into the suprahilar region on the right with enlarged lymph nodes in the right hilum.  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  Additional nodes are seen within the mediastinum measuring 14 mm.  Recommend PET scan and/or tissue sampling.    Pleura: Trace right pleural effusion and dependent changes right lung base.No pleural calcification.    Brandy/Mediastinum: Right hilar adenopathy as well as mediastinal adenopathy concerning for metastatic disease.    Esophagus: Normal.    Heart/pericardium: Normal size.  No pericardial effusion or calcification.    Pulmonary vasculature: Pulmonary arteries distribute normally.  There are four pulmonary veins.    Aorta: Left-sided aortic arch with 3 arterial branches.  The aorta maintains normal caliber, contour and course. There is mild calcification of the thoracic aorta.  There is  mild coronary artery calcification.    Thoracic soft tissues: Normal. Both breasts are present.    Bones: No acute fracture. No suspicious lytic or sclerotic lesion.    Upper Abdomen: Mild nodular thickening the left adrenal gland without discrete mass.  Mild constipation within the large bowel visualized.  Small hiatal hernia.  Impression: 5.4 x 5.7 cm mass suspected within the right upper lobe with areas of necrosis. Mass extends into the suprahilar region on the right with enlarged lymph nodes in the right hilum. Surrounding interstitial thickening within the right upper lobe could reflect lymphangitic  carcinomatosis.  Additional nodes are seen within the mediastinum measuring 14 mm.  Recommend pulmonary referral as well as PET scan and/or tissue sampling.    All CT scans at this facility use dose modulation, iterative reconstruction and/or weight based dosing when appropriate to reduce radiation dose to as low as reasonably achievable.    Electronically signed by: Srini Faith MD  Date:    09/02/2022  Time:    13:27  X-Ray Chest 1 View  Narrative: EXAMINATION:  XR CHEST 1 VIEW    CLINICAL HISTORY:  Chest pain, unspecified    TECHNIQUE:  Single frontal view of the chest was performed.    COMPARISON:  Chest radiograph 10/25/2020    FINDINGS:  There is interval development of a large medial right upper lung mass.There is mild bibasilar interstitial opacity.  Possible trace right pleural fluid.  No significant left pleural fluid.  No pneumothorax.  There is biapical pleuroparenchymal scarring.    The cardiac silhouette is stable.  Thoracic aorta is tortuous.    No acute osseous abnormality.  Impression: New large right suprahilar mass.  Further evaluation with CT chest recommended with intravenous contrast.    Electronically signed by: Roma Bañuelos  Date:    09/02/2022  Time:    10:57               ABG  No results for input(s): PH, PO2, PCO2, HCO3, BE in the last 168 hours.  Assessment/Plan:     Chest pain  EKG normal    Lung mass  5.4 by 5.7 cm MASS  Involving suprahilar region right side and LN  Level 7: 14 mm  Plan for Bronch+ EBUS in AM  Consent  NPO after 12 MN    Tobacco abuse  Smoking cessation    My diagnostic impression and work-up plan were discussed at length with patient. Risks were discussed. EBUS procedure. Complications of the procedure discussed in detail with patient. Complications including but not limited to infection that may require hospital admission, bleeding that may require blood transfusion and or hospital admission, perforation of the lung which may require surgery,chance of injury to  the throat, windpipe or bronchial tubes, laryngospam, coughing, aspiration, hypoxemia or cardiac arrythmias. Patient expressed and verbalized understanding. The material risks of anesthesia in connection with the procedure including brain damage, paralysis from the neck downwards, paralysis from the waist downwards, loss of function of an arm or a leg, disfigurement (incluing scars)and death were discussed with patient who expressedand verbalized understanding. Alternate treatments and material risks associated with such alternatives were discussed with pateint. These include radiologic surveillance with minimal risk and sugery with an indeterminate risk. The material risks of refusing the procedure was discussed in detail. This includes no diagnosis or confirmation of diagnisis and rendering of appropriate treatment the risk of which depends on the nature of the diagnosed illness. Patient expressed and verbalized understanding. Procedure scheduled for date 09/03/2022. Consent signed. Orders entered. Coagulation studies per orders.   All questions were answered and the patient expressed understanding         Thank you for your consult. I will follow-up with patient. Please contact us if you have any additional questions.     Rakesh Inman MD  Pulmonology  [facility] - Emergency Dept.

## 2022-09-02 NOTE — ASSESSMENT & PLAN NOTE
Telemetry  Consult Pulmonology  NPO after midnight for possible  brochoscopy with Bx tomorrow  Patient discussed with Pulmonology and does not require Abx at this time

## 2022-09-02 NOTE — SUBJECTIVE & OBJECTIVE
Past Medical History:   Diagnosis Date    Hypertension 9/2/2022       History reviewed. No pertinent surgical history.    Review of patient's allergies indicates:  No Known Allergies    No current facility-administered medications on file prior to encounter.     Current Outpatient Medications on File Prior to Encounter   Medication Sig    [DISCONTINUED] gabapentin (NEURONTIN) 300 MG capsule Take 300 mg by mouth 3 (three) times daily.    [DISCONTINUED] methocarbamoL (ROBAXIN) 750 MG Tab Take 500 mg by mouth 4 (four) times daily.    [DISCONTINUED] nabumetone (RELAFEN) 500 MG tablet Take 500 mg by mouth 2 (two) times daily.    [DISCONTINUED] naproxen (NAPROSYN) 375 MG tablet Take 1 tablet (375 mg total) by mouth 2 (two) times daily with meals. Prn pain    [DISCONTINUED] orphenadrine (NORFLEX) 100 mg tablet Take 1 tablet (100 mg total) by mouth 2 (two) times daily.    [DISCONTINUED] traMADol (ULTRAM) 50 mg tablet Take 1 tablet (50 mg total) by mouth every 6 (six) hours as needed for Pain.     Family History       Problem Relation (Age of Onset)    Alcohol abuse Father          Tobacco Use    Smoking status: Every Day     Packs/day: 1.00     Types: Cigarettes    Smokeless tobacco: Never   Substance and Sexual Activity    Alcohol use: Yes     Comment: occasional    Drug use: No    Sexual activity: Yes     Partners: Female     Review of Systems   Constitutional:  Positive for activity change and fatigue. Negative for appetite change and fever.   HENT: Negative.  Negative for congestion, sinus pressure and sore throat.    Eyes: Negative.  Negative for photophobia, discharge and visual disturbance.   Respiratory:  Positive for cough and shortness of breath. Negative for chest tightness and wheezing.    Cardiovascular: Negative.  Negative for chest pain and palpitations.   Gastrointestinal: Negative.  Negative for abdominal pain, blood in stool, constipation, diarrhea, nausea and vomiting.   Endocrine: Negative.     Genitourinary: Negative.    Musculoskeletal:  Positive for myalgias and neck pain. Negative for neck stiffness.   Skin: Negative.    Allergic/Immunologic: Negative.    Neurological:  Positive for weakness. Negative for seizures, syncope and headaches.   Hematological: Negative.    Psychiatric/Behavioral: Negative.  Negative for agitation, behavioral problems, hallucinations, self-injury and suicidal ideas.    Objective:     Vital Signs (Most Recent):  Temp: 98 °F (36.7 °C) (09/02/22 0956)  Pulse: 89 (09/02/22 1635)  Resp: (!) 24 (09/02/22 1635)  BP: (!) 180/98 (09/02/22 1739)  SpO2: 98 % (09/02/22 1635)   Vital Signs (24h Range):  Temp:  [98 °F (36.7 °C)] 98 °F (36.7 °C)  Pulse:  [72-99] 89  Resp:  [18-31] 24  SpO2:  [96 %-100 %] 98 %  BP: (160-211)/() 180/98     Weight: 86.7 kg (191 lb 4 oz)  Body mass index is 22.1 kg/m².    Physical Exam  Vitals and nursing note reviewed.   Constitutional:       General: He is not in acute distress.     Appearance: He is well-developed. He is not toxic-appearing or diaphoretic.   HENT:      Head: Normocephalic and atraumatic.   Eyes:      Pupils: Pupils are equal, round, and reactive to light.   Cardiovascular:      Rate and Rhythm: Normal rate and regular rhythm.      Heart sounds: Normal heart sounds. No murmur heard.  Pulmonary:      Effort: Pulmonary effort is normal. No respiratory distress.      Breath sounds: Normal breath sounds. No wheezing.   Abdominal:      General: Bowel sounds are normal. There is no distension.      Palpations: Abdomen is soft. There is no mass.      Tenderness: There is no abdominal tenderness.   Musculoskeletal:         General: No deformity. Normal range of motion.      Cervical back: Normal range of motion and neck supple. Tenderness present.      Right lower leg: No edema.      Left lower leg: No edema.   Skin:     General: Skin is warm and dry.      Capillary Refill: Capillary refill takes 2 to 3 seconds.   Neurological:      General:  No focal deficit present.      Mental Status: He is alert and oriented to person, place, and time. Mental status is at baseline.      Deep Tendon Reflexes: Reflexes are normal and symmetric.   Psychiatric:         Mood and Affect: Mood normal.         Behavior: Behavior normal.         Thought Content: Thought content normal.         Judgment: Judgment normal.         CRANIAL NERVES     CN III, IV, VI   Pupils are equal, round, and reactive to light.     Significant Labs: All pertinent labs within the past 24 hours have been reviewed.  Blood Culture: No results for input(s): LABBLOO in the last 48 hours.  BMP:   Recent Labs   Lab 09/02/22  1101   *      K 3.6      CO2 27   BUN 12   CREATININE 1.2   CALCIUM 10.2     CBC:   Recent Labs   Lab 09/02/22  1101   WBC 7.84   HGB 13.8*   HCT 42.1   *     CMP:   Recent Labs   Lab 09/02/22  1101      K 3.6      CO2 27   *   BUN 12   CREATININE 1.2   CALCIUM 10.2   PROT 8.4   ALBUMIN 3.2*   BILITOT 0.9   ALKPHOS 138*   AST 13   ALT 8*   ANIONGAP 9     Troponin:   Recent Labs   Lab 09/02/22  1101   TROPONINI 0.008     Imaging Results              CT Chest With Contrast (Final result)  Result time 09/02/22 13:27:56      Final result by Srini Faith MD (09/02/22 13:27:56)                   Impression:      5.4 x 5.7 cm mass suspected within the right upper lobe with areas of necrosis. Mass extends into the suprahilar region on the right with enlarged lymph nodes in the right hilum. 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  Additional nodes are seen within the mediastinum measuring 14 mm.  Recommend pulmonary referral as well as PET scan and/or tissue sampling.    All CT scans at this facility use dose modulation, iterative reconstruction and/or weight based dosing when appropriate to reduce radiation dose to as low as reasonably achievable.      Electronically signed  by: Srini Faith MD  Date:    09/02/2022  Time:    13:27               Narrative:    EXAMINATION:  CT CHEST WITH CONTRAST    CLINICAL HISTORY:  Abnormal xray - lung nodule, >= 1 cm;    TECHNIQUE:  The chest was surveyed from the apices through the posterior costophrenic angles after administration of 100 cc of IV contrast..  Data was reconstructed for multiplanar images in axial, sagittal and coronal planes in for maximal intensity projection images in the axial plane.    COMPARISON:  09/02/2022    FINDINGS:  Base of Neck: No significant abnormality.    Airways: Patent centrally.  Right upper lobe bronchus is occluded by mass.    Lungs: 5.4 x 5.7 cm mass suspected within the right upper lobe with areas of necrosis.  Mass extends into the suprahilar region on the right with enlarged lymph nodes in the right hilum.  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  Additional nodes are seen within the mediastinum measuring 14 mm.  Recommend PET scan and/or tissue sampling.    Pleura: Trace right pleural effusion and dependent changes right lung base.No pleural calcification.    Brandy/Mediastinum: Right hilar adenopathy as well as mediastinal adenopathy concerning for metastatic disease.    Esophagus: Normal.    Heart/pericardium: Normal size.  No pericardial effusion or calcification.    Pulmonary vasculature: Pulmonary arteries distribute normally.  There are four pulmonary veins.    Aorta: Left-sided aortic arch with 3 arterial branches.  The aorta maintains normal caliber, contour and course. There is mild calcification of the thoracic aorta.  There is  mild coronary artery calcification.    Thoracic soft tissues: Normal. Both breasts are present.    Bones: No acute fracture. No suspicious lytic or sclerotic lesion.    Upper Abdomen: Mild nodular thickening the left adrenal gland without discrete mass.  Mild constipation within the large bowel visualized.   Small hiatal hernia.                                       X-Ray Chest 1 View (Final result)  Result time 09/02/22 10:57:56      Final result by Roma Bañuelos MD (09/02/22 10:57:56)                   Impression:      New large right suprahilar mass.  Further evaluation with CT chest recommended with intravenous contrast.      Electronically signed by: Roma Bañuelos  Date:    09/02/2022  Time:    10:57               Narrative:    EXAMINATION:  XR CHEST 1 VIEW    CLINICAL HISTORY:  Chest pain, unspecified    TECHNIQUE:  Single frontal view of the chest was performed.    COMPARISON:  Chest radiograph 10/25/2020    FINDINGS:  There is interval development of a large medial right upper lung mass.There is mild bibasilar interstitial opacity.  Possible trace right pleural fluid.  No significant left pleural fluid.  No pneumothorax.  There is biapical pleuroparenchymal scarring.    The cardiac silhouette is stable.  Thoracic aorta is tortuous.    No acute osseous abnormality.                                        Significant Imaging: I have reviewed all pertinent imaging results/findings within the past 24 hours.

## 2022-09-02 NOTE — ASSESSMENT & PLAN NOTE
5.4 by 5.7 cm MASS  Involving suprahilar region right side and LN  Level 7: 14 mm  Plan for Bronch+ EBUS in AM  Consent  NPO after 12 MN

## 2022-09-02 NOTE — FIRST PROVIDER EVALUATION
Medical screening exam completed.  I have conducted a focused provider triage encounter, findings are as follows:    Brief history of present illness: 57 year old male with complaint of chest pain X 3 weeks. Also reports swelling on right side of neck X 3 weeks.     There were no vitals filed for this visit.    Pertinent physical exam:  [id number]

## 2022-09-02 NOTE — ED PROVIDER NOTES
SCRIBE #1 NOTE: I, Bri Mendieta, am scribing for, and in the presence of, Blanca Campoverde Do, MD. I have scribed the entire note.       History     Chief Complaint   Patient presents with    Lymphadenopathy     Pt complaining of R sided gland swelling and pain x 1 month     Review of patient's allergies indicates:  No Known Allergies      History of Present Illness     HPI    9/2/2022, 12:07 PM  History obtained from the patient      History of Present Illness: Srinivasan Saucedo III is a 57 y.o. male patient who presents to the Emergency Department for evaluation of lymphadenopathy which onset 1 month PTA. Symptoms are constant and moderate in severity. Pt states that he has R sided gland swelling and pain. He also states that he has a burning sensation that comes from stomach and radiates to chest and neck. He has not been able to see anyone about this but has a PCP appointment this upcoming Wednesday with Dr. Vega who he will see for the first time. Associated sxs include neck pain. Patient denies any fever, chills, n/v/d, CP, SOB, and all other sxs at this time. No prior Tx reported. No further complaints or concerns at this time.       Arrival mode: Personal vehicle     PCP: Primary Doctor No        Past Medical History:  Past Medical History:   Diagnosis Date    Hypertension 9/2/2022       Past Surgical History:  History reviewed. No pertinent surgical history.      Family History:  Family History   Problem Relation Age of Onset    Alcohol abuse Father        Social History:  Social History     Tobacco Use    Smoking status: Every Day     Packs/day: 1.00     Types: Cigarettes    Smokeless tobacco: Never   Substance and Sexual Activity    Alcohol use: Yes     Comment: occasional    Drug use: No    Sexual activity: Yes     Partners: Female        Review of Systems     Review of Systems   Constitutional:  Negative for chills and fever.   HENT:  Negative for sore throat.    Respiratory:  Negative for shortness of  breath.    Cardiovascular:  Negative for chest pain.   Gastrointestinal:  Negative for diarrhea, nausea and vomiting.   Genitourinary:  Negative for dysuria.   Musculoskeletal:  Positive for neck pain (R). Negative for back pain.   Skin:  Negative for rash.   Neurological:  Negative for weakness.   Hematological:  Does not bruise/bleed easily.   All other systems reviewed and are negative.     Physical Exam     Initial Vitals   BP Pulse Resp Temp SpO2   09/02/22 0957 09/02/22 0956 09/02/22 0956 09/02/22 0956 09/02/22 0956   (!) 183/111 99 18 98 °F (36.7 °C) 100 %      MAP       --                 Physical Exam  Nursing Notes and Vital Signs Reviewed.  Constitutional: Patient is in no acute distress. Well-developed and well-nourished.  Head: Atraumatic. Normocephalic.  Eyes: PERRL. EOM intact. Conjunctivae are not pale. No scleral icterus.  ENT: Mucous membranes are moist. Oropharynx is clear and symmetric. Pt has dental caries and poor dentition but no necrotic areas. No ludwigs. No submental swelling  Neck: Supple. No anterior or posterior lymphadenopathy. No signs of infection. No superior clavicular adenopathy. Face and neck symmetrical  Cardiovascular: Regular rate. Regular rhythm. No murmurs, rubs, or gallops. Distal pulses are 2+ and symmetric.  Pulmonary/Chest: No respiratory distress. Clear to auscultation bilaterally. No wheezing or rales.  Abdominal: Soft and non-distended.  There is no tenderness.  No rebound, guarding, or rigidity. Good bowel sounds.  Genitourinary: No CVA tenderness  Musculoskeletal: Moves all extremities. No obvious deformities. No edema. No calf tenderness.  Skin: Warm and dry.  Neurological:  Alert, awake, and appropriate.  Normal speech.  No acute focal neurological deficits are appreciated.  Psychiatric: Normal affect. Good eye contact. Appropriate in content.     ED Course   Critical Care    Date/Time: 9/2/2022 3:49 PM  Performed by: Blanca Campoverde Do, MD  Authorized by: Blanca  Nirali Gotti MD   Direct patient critical care time: 20 minutes  Ordering / reviewing critical care time: 10 minutes  Documentation critical care time: 5 minutes  Other critical care time: 5 minutes  Total critical care time (exclusive of procedural time) : 40 minutes  Critical care time was exclusive of separately billable procedures and treating other patients and teaching time.  Critical care was necessary to treat or prevent imminent or life-threatening deterioration of the following conditions: Hypertension.  Critical care was time spent personally by me on the following activities: development of treatment plan with patient or surrogate, blood draw for specimens, discussions with consultants, interpretation of cardiac output measurements, evaluation of patient's response to treatment, examination of patient, obtaining history from patient or surrogate, ordering and performing treatments and interventions, ordering and review of laboratory studies, ordering and review of radiographic studies, pulse oximetry, re-evaluation of patient's condition and review of old charts.      ED Vital Signs:  Vitals:    09/02/22 1118 09/02/22 1130 09/02/22 1200 09/02/22 1330   BP:  (!) 175/101 (!) 160/101 (!) 180/112   Pulse: 85 84 72 75   Resp:  (!) 26 20 (!) 21   Temp:       TempSrc:       SpO2:  99% 98% 98%   Weight:       Height:        09/02/22 1410 09/02/22 1430 09/02/22 1500 09/02/22 1532   BP: (!) 211/105 (!) 179/102 (!) 179/104 (!) 190/117   Pulse: 81 78 87 94   Resp: (!) 24 (!) 23 (!) 25 (!) 31   Temp:       TempSrc:       SpO2: 97% 96% 98% 100%   Weight:       Height:        09/02/22 1635 09/02/22 1640 09/02/22 1700 09/02/22 1739   BP: (!) 162/93 (!) 163/102 (!) 160/95 (!) 180/98   Pulse: 89 100 89    Resp: (!) 24 20 (!) 22    Temp:       TempSrc:       SpO2: 98% 99% 98%    Weight:       Height:        09/02/22 1740 09/02/22 1800 09/02/22 1830   BP: (!) 180/98 (!) 139/91 (!) 148/99   Pulse: 85 75 86   Resp: (!) 28 18 (!)  22   Temp:      TempSrc:      SpO2: 100% 97% 98%   Weight:      Height:          Abnormal Lab Results:  Labs Reviewed   CBC W/ AUTO DIFFERENTIAL - Abnormal; Notable for the following components:       Result Value    RBC 4.17 (*)     Hemoglobin 13.8 (*)      (*)     MCH 33.1 (*)     Platelets 586 (*)     MPV 8.4 (*)     Lymph % 15.9 (*)     All other components within normal limits   COMPREHENSIVE METABOLIC PANEL - Abnormal; Notable for the following components:    Glucose 112 (*)     Albumin 3.2 (*)     Alkaline Phosphatase 138 (*)     ALT 8 (*)     All other components within normal limits   CK   TROPONIN I   RAPID HIV   SARS-COV-2 RNA AMPLIFICATION, QUAL   DRUG SCREEN PANEL, URINE EMERGENCY    Narrative:     Specimen Source->Urine   PSA, SCREENING        All Lab Results:  Results for orders placed or performed during the hospital encounter of 09/02/22   CBC auto differential   Result Value Ref Range    WBC 7.84 3.90 - 12.70 K/uL    RBC 4.17 (L) 4.60 - 6.20 M/uL    Hemoglobin 13.8 (L) 14.0 - 18.0 g/dL    Hematocrit 42.1 40.0 - 54.0 %     (H) 82 - 98 fL    MCH 33.1 (H) 27.0 - 31.0 pg    MCHC 32.8 32.0 - 36.0 g/dL    RDW 13.9 11.5 - 14.5 %    Platelets 586 (H) 150 - 450 K/uL    MPV 8.4 (L) 9.2 - 12.9 fL    Immature Granulocytes 0.1 0.0 - 0.5 %    Gran # (ANC) 5.6 1.8 - 7.7 K/uL    Immature Grans (Abs) 0.01 0.00 - 0.04 K/uL    Lymph # 1.3 1.0 - 4.8 K/uL    Mono # 0.8 0.3 - 1.0 K/uL    Eos # 0.1 0.0 - 0.5 K/uL    Baso # 0.04 0.00 - 0.20 K/uL    nRBC 0 0 /100 WBC    Gran % 71.4 38.0 - 73.0 %    Lymph % 15.9 (L) 18.0 - 48.0 %    Mono % 10.3 4.0 - 15.0 %    Eosinophil % 1.8 0.0 - 8.0 %    Basophil % 0.5 0.0 - 1.9 %    Differential Method Automated    Comprehensive metabolic panel   Result Value Ref Range    Sodium 140 136 - 145 mmol/L    Potassium 3.6 3.5 - 5.1 mmol/L    Chloride 104 95 - 110 mmol/L    CO2 27 23 - 29 mmol/L    Glucose 112 (H) 70 - 110 mg/dL    BUN 12 6 - 20 mg/dL    Creatinine 1.2 0.5 - 1.4  mg/dL    Calcium 10.2 8.7 - 10.5 mg/dL    Total Protein 8.4 6.0 - 8.4 g/dL    Albumin 3.2 (L) 3.5 - 5.2 g/dL    Total Bilirubin 0.9 0.1 - 1.0 mg/dL    Alkaline Phosphatase 138 (H) 55 - 135 U/L    AST 13 10 - 40 U/L    ALT 8 (L) 10 - 44 U/L    Anion Gap 9 8 - 16 mmol/L    eGFR >60 >60 mL/min/1.73 m^2   CPK   Result Value Ref Range     20 - 200 U/L   Troponin I   Result Value Ref Range    Troponin I 0.008 0.000 - 0.026 ng/mL   Rapid HIV   Result Value Ref Range    HIV Rapid Testing Non-Reactive Negative   COVID-19 Rapid Screening   Result Value Ref Range    SARS-CoV-2 RNA, Amplification, Qual Negative Negative   Drug screen panel, in-house   Result Value Ref Range    Benzodiazepines Negative Negative    Methadone metabolites Negative Negative    Cocaine (Metab.) Negative Negative    Opiate Scrn, Ur Negative Negative    Barbiturate Screen, Ur Negative Negative    Amphetamine Screen, Ur Negative Negative    THC Negative Negative    Phencyclidine Negative Negative    Creatinine, Urine 352.2 23.0 - 375.0 mg/dL    Toxicology Information SEE COMMENT         Imaging Results:  Imaging Results              CT Chest With Contrast (Final result)  Result time 09/02/22 13:27:56      Final result by Srini Faith MD (09/02/22 13:27:56)                   Impression:      5.4 x 5.7 cm mass suspected within the right upper lobe with areas of necrosis. Mass extends into the suprahilar region on the right with enlarged lymph nodes in the right hilum. 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  Additional nodes are seen within the mediastinum measuring 14 mm.  Recommend pulmonary referral as well as PET scan and/or tissue sampling.    All CT scans at this facility use dose modulation, iterative reconstruction and/or weight based dosing when appropriate to reduce radiation dose to as low as reasonably achievable.      Electronically signed by: Srini Faith  MD  Date:    09/02/2022  Time:    13:27               Narrative:    EXAMINATION:  CT CHEST WITH CONTRAST    CLINICAL HISTORY:  Abnormal xray - lung nodule, >= 1 cm;    TECHNIQUE:  The chest was surveyed from the apices through the posterior costophrenic angles after administration of 100 cc of IV contrast..  Data was reconstructed for multiplanar images in axial, sagittal and coronal planes in for maximal intensity projection images in the axial plane.    COMPARISON:  09/02/2022    FINDINGS:  Base of Neck: No significant abnormality.    Airways: Patent centrally.  Right upper lobe bronchus is occluded by mass.    Lungs: 5.4 x 5.7 cm mass suspected within the right upper lobe with areas of necrosis.  Mass extends into the suprahilar region on the right with enlarged lymph nodes in the right hilum.  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  Additional nodes are seen within the mediastinum measuring 14 mm.  Recommend PET scan and/or tissue sampling.    Pleura: Trace right pleural effusion and dependent changes right lung base.No pleural calcification.    Brandy/Mediastinum: Right hilar adenopathy as well as mediastinal adenopathy concerning for metastatic disease.    Esophagus: Normal.    Heart/pericardium: Normal size.  No pericardial effusion or calcification.    Pulmonary vasculature: Pulmonary arteries distribute normally.  There are four pulmonary veins.    Aorta: Left-sided aortic arch with 3 arterial branches.  The aorta maintains normal caliber, contour and course. There is mild calcification of the thoracic aorta.  There is  mild coronary artery calcification.    Thoracic soft tissues: Normal. Both breasts are present.    Bones: No acute fracture. No suspicious lytic or sclerotic lesion.    Upper Abdomen: Mild nodular thickening the left adrenal gland without discrete mass.  Mild constipation within the large bowel visualized.  Small hiatal  hernia.                                       X-Ray Chest 1 View (Final result)  Result time 09/02/22 10:57:56      Final result by Roma Bañuelos MD (09/02/22 10:57:56)                   Impression:      New large right suprahilar mass.  Further evaluation with CT chest recommended with intravenous contrast.      Electronically signed by: Roma Bañuelos  Date:    09/02/2022  Time:    10:57               Narrative:    EXAMINATION:  XR CHEST 1 VIEW    CLINICAL HISTORY:  Chest pain, unspecified    TECHNIQUE:  Single frontal view of the chest was performed.    COMPARISON:  Chest radiograph 10/25/2020    FINDINGS:  There is interval development of a large medial right upper lung mass.There is mild bibasilar interstitial opacity.  Possible trace right pleural fluid.  No significant left pleural fluid.  No pneumothorax.  There is biapical pleuroparenchymal scarring.    The cardiac silhouette is stable.  Thoracic aorta is tortuous.    No acute osseous abnormality.                                       The EKG was ordered, reviewed, and independently interpreted by the ED provider.  Interpretation time: 11:13  Rate: 87 BPM  Rhythm: normal sinus rhythm  Interpretation: Cannot rule out Anterior infarct, age undetermined. No STEMI.             The Emergency Provider reviewed the vital signs and test results, which are outlined above.     ED Discussion     2:57PM: Discussed pt's case with Jacqueline Alvarenga NP (Hospital Medicine) who recommends outpatient work-up. Needs to see pulmonology in clinic first for bronch with biopsy. Since his stable this will not be done over the weekend. .     3:00 PM: Re-evaluated pt. Pt is resting comfortably and is in no acute distress.  D/w pt all pertinent results. CT scan shows mass on R upper lung. D/w pt treatment plan and will likely be admitted.  D/w pt any concerns expressed at this time. Answered all questions. Pt expresses understanding at this time.     3:16 PM: Discussed  pt's case with Dr. Rakesh Inman MD (Critical Care Medicine) who recommends pt needs bronch + EBUS, admit.     4:39 PM: Discussed case with Jacqueline Alvarenga NP (Hospital Medicine). Dr. Canchola agrees with current care and management of pt and accepts admission.   Admitting Service: Hospital Medicine  Admitting Physician: Dr. Canchola  Admit to:  inpatient tele    4:39 PM: Re-evaluated pt. I have discussed test results, shared treatment plan, and the need for admission with patient and family at bedside. Pt and family express understanding at this time and agree with all information. All questions answered. Pt and family have no further questions or concerns at this time. Pt is ready for admit.        Medical Decision Making:   Clinical Tests:   Lab Tests: Ordered and Reviewed  Radiological Study: Ordered and Reviewed  Medical Tests: Ordered and Reviewed         ED Medication(s):  Medications   lisinopriL tablet 20 mg (20 mg Oral Given 9/2/22 1739)   hydrALAZINE injection 10 mg (10 mg Intravenous Not Given 9/2/22 1801)   sodium chloride 0.9% flush 10 mL (has no administration in time range)   naloxone 0.4 mg/mL injection 0.02 mg (has no administration in time range)   dextrose 10% bolus 125 mL (has no administration in time range)   dextrose 10% bolus 250 mL (has no administration in time range)   acetaminophen tablet 650 mg (has no administration in time range)   polyethylene glycol packet 17 g (has no administration in time range)   ondansetron injection 4 mg (has no administration in time range)   melatonin tablet 6 mg (has no administration in time range)   simethicone chewable tablet 80 mg (has no administration in time range)   aluminum-magnesium hydroxide-simethicone 200-200-20 mg/5 mL suspension 30 mL (has no administration in time range)   nicotine 21 mg/24 hr 1 patch (has no administration in time range)   iohexoL (OMNIPAQUE 350) injection 100 mL (100 mLs Intravenous Given 9/2/22 1313)   hydrALAZINE  injection 10 mg (10 mg Intravenous Given 9/2/22 1439)   cloNIDine tablet 0.2 mg (0.2 mg Oral Given 9/2/22 1539)   potassium chloride SA CR tablet 40 mEq (40 mEq Oral Given 9/2/22 1804)       New Prescriptions    No medications on file               Scribe Attestation:   Scribe #1: I performed the above scribed service and the documentation accurately describes the services I performed. I attest to the accuracy of the note.     Attending:   Physician Attestation Statement for Scribe #1: I, Blanca Campoverde Do, MD, personally performed the services described in this documentation, as scribed by Bri Mendieta, in my presence, and it is both accurate and complete.           Clinical Impression       ICD-10-CM ICD-9-CM   1. Lung mass  R91.8 786.6   2. Chest pain  R07.9 786.50   3. Hypertensive urgency  I16.0 401.9   4. Mass of right lung  R91.8 786.6       Disposition:   Disposition: Admitted  Condition: Fair      Blanca Campoverde Do, MD  09/02/22 1959

## 2022-09-02 NOTE — CONSULTS
[facility] - Emergency Dept.  Pulmonology  Consult Note    Patient Name: Srinivasan Saucedo III  MRN: [id number]  Admission Date: 9/2/2022  Hospital Length of Stay: 0 days  Code Status: Prior  Attending Physician: Blanca Campoverde Do, MD  Primary Care Provider: Primary Doctor No   Principal Problem: <principal problem not specified>    [unfilled]  Subjective:     HPI:  Srinivasan Saucedo III is 57 y.o.  Asked to see for abn cxr and chest CT  Smoker  Never seen PCP  Presented for chest pain, BP elevated in ER  Imaging reviewed with patient  Location, Size, enlarge level 7 and paratracheal LN: concern for likely Squamous malignancy  No hemoptysis  EKG : NSR  Bronchscopy procedure described to patient        No new subjective & objective note has been filed under this hospital service since the last note was generated.      ABG  No results for input(s): PH, PO2, PCO2, HCO3, BE in the last 168 hours.  Assessment/Plan:     Hypertensive urgency  New dx  Uncontrolled  meds started    Chest pain  EKG normal    Lung mass  5.4 by 5.7 cm MASS  Involving suprahilar region right side and LN  Level 7: 14 mm  Plan for Bronch+ EBUS in AM  Consent  NPO after 12 MN    Tobacco abuse  Smoking cessation          Thank you for your consult. I will follow-up with patient. Please contact us if you have any additional questions.     Rakesh Inman MD  Pulmonology  [facility] - Emergency Dept.

## 2022-09-02 NOTE — HPI
Lymphadenopathy        Pt complaining of R sided gland swelling and pain x 1 month      Per ER- This is a 57 y.o. male patient who presents to the Emergency Department for evaluation of lymphadenopathy which onset 1 month PTA. Symptoms are constant and moderate in severity. Pt states that he has R sided gland swelling and pain. He also states that he has a burning sensation that comes from stomach and radiates to chest and neck. He has not been able to see anyone about this but has a PCP appointment this upcoming Wednesday with Dr. Vega who he will see for the first time. Associated sxs include neck pain. Patient denies any fever, chills, n/v/d, CP, SOB, and all other sxs at this time. No prior Tx reported. No further complaints or concerns at this time.      Patient evaluated by ER and had Ct scan showed a 5.4 x 5.7 cm mass suspected within the right upper lobe with areas of necrosis.  Mass extends into the suprahilar region on the right with enlarged lymph nodes in the right hilum.  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  Additional nodes are seen within the mediastinum measuring 14 mm. Patient also with HTN Urgency. Patient placed in Observation.      Records reviewed show patient has a Hx of nicotine dependence, chronic pain, and HTN.

## 2022-09-02 NOTE — ED NOTES
Patient placed on continuous pulse ox, blood pressure, and cardiac monitor. Call light within reach of patient.

## 2022-09-02 NOTE — PHARMACY MED REC
"Admission Medication History     The home medication history was taken by Nahun Hawkins.    You may go to "Admission" then "Reconcile Home Medications" tabs to review and/or act upon these items.     The home medication list has been updated by the Pharmacy department.   Please read ALL comments highlighted in yellow.   Please address this information as you see fit.    Feel free to contact us if you have any questions or require assistance.      Medications listed below were obtained from: Patient/family and Analytic software- [employer]  (Not in a hospital admission)        Nahun Hawkins  [id number]    Current Outpatient Medications on File Prior to Encounter   Medication Sig Dispense Refill Last Dose   NO CURRENT OUTPATIENT MEDICATIONS                      .        "

## 2022-09-02 NOTE — H&P (VIEW-ONLY)
[facility] - Emergency Dept.  Pulmonology  Consult Note    Patient Name: Srinivasan Saucedo III  MRN: [id number]  Admission Date: 9/2/2022  Hospital Length of Stay: 0 days  Code Status: Prior  Attending Physician: Blanca Campoverde Do, MD  Primary Care Provider: Primary Doctor No   Principal Problem: <principal problem not specified>    [unfilled]  Subjective:     HPI:  Srinivasan Saucedo III is 57 y.o.  Asked to see for abn cxr and chest CT  Smoker  Never seen PCP  Presented for chest pain, BP elevated in ER  Imaging reviewed with patient  Location, Size, enlarge level 7 and paratracheal LN: concern for likely Squamous malignancy  No hemoptysis  EKG : NSR  Bronchscopy procedure described to patient        No past medical history on file.    No past surgical history on file.    Review of patient's allergies indicates:  No Known Allergies    Family History    None       Tobacco Use    Smoking status: Every Day     Packs/day: 1.00     Types: Cigarettes    Smokeless tobacco: Never   Substance and Sexual Activity    Alcohol use: Yes     Comment: occasional    Drug use: No    Sexual activity: Not on file         Review of Systems   Constitutional:  Positive for fatigue.   Respiratory:  Positive for chest tightness.    Cardiovascular:  Positive for chest pain.   Objective:     Vital Signs (Most Recent):  Temp: 98 °F (36.7 °C) (09/02/22 0956)  Pulse: 94 (09/02/22 1532)  Resp: (!) 31 (09/02/22 1532)  BP: (!) 190/117 (09/02/22 1532)  SpO2: 100 % (09/02/22 1532)   Vital Signs (24h Range):  Temp:  [98 °F (36.7 °C)] 98 °F (36.7 °C)  Pulse:  [72-99] 94  Resp:  [18-31] 31  SpO2:  [96 %-100 %] 100 %  BP: (160-211)/(101-120) 190/117     Weight: 86.7 kg (191 lb 4 oz)  Body mass index is 22.1 kg/m².    No intake or output data in the 24 hours ending 09/02/22 1636    Physical Exam  Vitals and nursing note reviewed.   Constitutional:       Appearance: Normal appearance.   HENT:      Head: Normocephalic and atraumatic.      Mouth/Throat:      Mouth:  Mucous membranes are dry.   Eyes:      Pupils: Pupils are equal, round, and reactive to light.   Cardiovascular:      Rate and Rhythm: Normal rate and regular rhythm.      Pulses: Normal pulses.      Heart sounds: Normal heart sounds.   Pulmonary:      Effort: Pulmonary effort is normal.      Breath sounds: Normal breath sounds.   Musculoskeletal:         General: Normal range of motion.      Cervical back: Normal range of motion and neck supple.   Skin:     General: Skin is warm.   Neurological:      General: No focal deficit present.      Mental Status: He is alert.       Vents:       Lines/Drains/Airways       Peripheral Intravenous Line  Duration                  Peripheral IV - Single Lumen 09/02/22 1120 20 G Posterior;Right Hand <1 day                    Significant Labs:    CBC/Anemia Profile:  Recent Labs   Lab 09/02/22  1101   WBC 7.84   HGB 13.8*   HCT 42.1   *   *   RDW 13.9        Chemistries:  Recent Labs   Lab 09/02/22  1101      K 3.6      CO2 27   BUN 12   CREATININE 1.2   CALCIUM 10.2   ALBUMIN 3.2*   PROT 8.4   BILITOT 0.9   ALKPHOS 138*   ALT 8*   AST 13       ABGs: No results for input(s): PH, PCO2, HCO3, POCSATURATED, BE in the last 48 hours.  Cardiac Markers: No results for input(s): CKMB, TROPONINT, MYOGLOBIN in the last 48 hours.  Coagulation: No results for input(s): PT, INR, APTT in the last 48 hours.  POCT Glucose: No results for input(s): POCTGLUCOSE in the last 48 hours.  Troponin:   Recent Labs   Lab 09/02/22  1101   TROPONINI 0.008     All pertinent labs within the past 24 hours have been reviewed.    Significant Imaging:   I have reviewed all pertinent imaging results/findings within the past 24 hours.    CT Chest With Contrast  Narrative: EXAMINATION:  CT CHEST WITH CONTRAST    CLINICAL HISTORY:  Abnormal xray - lung nodule, >= 1 cm;    TECHNIQUE:  The chest was surveyed from the apices through the posterior costophrenic angles after administration of 100 cc  of IV contrast..  Data was reconstructed for multiplanar images in axial, sagittal and coronal planes in for maximal intensity projection images in the axial plane.    COMPARISON:  09/02/2022    FINDINGS:  Base of Neck: No significant abnormality.    Airways: Patent centrally.  Right upper lobe bronchus is occluded by mass.    Lungs: 5.4 x 5.7 cm mass suspected within the right upper lobe with areas of necrosis.  Mass extends into the suprahilar region on the right with enlarged lymph nodes in the right hilum.  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  Additional nodes are seen within the mediastinum measuring 14 mm.  Recommend PET scan and/or tissue sampling.    Pleura: Trace right pleural effusion and dependent changes right lung base.No pleural calcification.    Brandy/Mediastinum: Right hilar adenopathy as well as mediastinal adenopathy concerning for metastatic disease.    Esophagus: Normal.    Heart/pericardium: Normal size.  No pericardial effusion or calcification.    Pulmonary vasculature: Pulmonary arteries distribute normally.  There are four pulmonary veins.    Aorta: Left-sided aortic arch with 3 arterial branches.  The aorta maintains normal caliber, contour and course. There is mild calcification of the thoracic aorta.  There is  mild coronary artery calcification.    Thoracic soft tissues: Normal. Both breasts are present.    Bones: No acute fracture. No suspicious lytic or sclerotic lesion.    Upper Abdomen: Mild nodular thickening the left adrenal gland without discrete mass.  Mild constipation within the large bowel visualized.  Small hiatal hernia.  Impression: 5.4 x 5.7 cm mass suspected within the right upper lobe with areas of necrosis. Mass extends into the suprahilar region on the right with enlarged lymph nodes in the right hilum. Surrounding interstitial thickening within the right upper lobe could reflect lymphangitic  carcinomatosis.  Additional nodes are seen within the mediastinum measuring 14 mm.  Recommend pulmonary referral as well as PET scan and/or tissue sampling.    All CT scans at this facility use dose modulation, iterative reconstruction and/or weight based dosing when appropriate to reduce radiation dose to as low as reasonably achievable.    Electronically signed by: Srini Faith MD  Date:    09/02/2022  Time:    13:27  X-Ray Chest 1 View  Narrative: EXAMINATION:  XR CHEST 1 VIEW    CLINICAL HISTORY:  Chest pain, unspecified    TECHNIQUE:  Single frontal view of the chest was performed.    COMPARISON:  Chest radiograph 10/25/2020    FINDINGS:  There is interval development of a large medial right upper lung mass.There is mild bibasilar interstitial opacity.  Possible trace right pleural fluid.  No significant left pleural fluid.  No pneumothorax.  There is biapical pleuroparenchymal scarring.    The cardiac silhouette is stable.  Thoracic aorta is tortuous.    No acute osseous abnormality.  Impression: New large right suprahilar mass.  Further evaluation with CT chest recommended with intravenous contrast.    Electronically signed by: Roma Bañuelos  Date:    09/02/2022  Time:    10:57               ABG  No results for input(s): PH, PO2, PCO2, HCO3, BE in the last 168 hours.  Assessment/Plan:     Chest pain  EKG normal    Lung mass  5.4 by 5.7 cm MASS  Involving suprahilar region right side and LN  Level 7: 14 mm  Plan for Bronch+ EBUS in AM  Consent  NPO after 12 MN    Tobacco abuse  Smoking cessation    My diagnostic impression and work-up plan were discussed at length with patient. Risks were discussed. EBUS procedure. Complications of the procedure discussed in detail with patient. Complications including but not limited to infection that may require hospital admission, bleeding that may require blood transfusion and or hospital admission, perforation of the lung which may require surgery,chance of injury to  the throat, windpipe or bronchial tubes, laryngospam, coughing, aspiration, hypoxemia or cardiac arrythmias. Patient expressed and verbalized understanding. The material risks of anesthesia in connection with the procedure including brain damage, paralysis from the neck downwards, paralysis from the waist downwards, loss of function of an arm or a leg, disfigurement (incluing scars)and death were discussed with patient who expressedand verbalized understanding. Alternate treatments and material risks associated with such alternatives were discussed with pateint. These include radiologic surveillance with minimal risk and sugery with an indeterminate risk. The material risks of refusing the procedure was discussed in detail. This includes no diagnosis or confirmation of diagnisis and rendering of appropriate treatment the risk of which depends on the nature of the diagnosed illness. Patient expressed and verbalized understanding. Procedure scheduled for date 09/03/2022. Consent signed. Orders entered. Coagulation studies per orders.   All questions were answered and the patient expressed understanding         Thank you for your consult. I will follow-up with patient. Please contact us if you have any additional questions.     Rakesh Inman MD  Pulmonology  [facility] - [facility] Dept.

## 2022-09-02 NOTE — HOSPITAL COURSE
09/03/2022 Seen and examined: tolerated procedure, some post op nausea. Results explained, CXR revewied

## 2022-09-02 NOTE — H&P (VIEW-ONLY)
[facility] - Emergency Dept.  Pulmonology  Consult Note    Patient Name: Srinivasan Saucedo III  MRN: [id number]  Admission Date: 9/2/2022  Hospital Length of Stay: 0 days  Code Status: Prior  Attending Physician: Blanca Campoverde Do, MD  Primary Care Provider: Primary Doctor No   Principal Problem: <principal problem not specified>    [unfilled]  Subjective:     HPI:  Srinivasan Saucedo III is 57 y.o.  Asked to see for abn cxr and chest CT  Smoker  Never seen PCP  Presented for chest pain, BP elevated in ER  Imaging reviewed with patient  Location, Size, enlarge level 7 and paratracheal LN: concern for likely Squamous malignancy  No hemoptysis  EKG : NSR  Bronchscopy procedure described to patient        No new subjective & objective note has been filed under this hospital service since the last note was generated.      ABG  No results for input(s): PH, PO2, PCO2, HCO3, BE in the last 168 hours.  Assessment/Plan:     Hypertensive urgency  New dx  Uncontrolled  meds started    Chest pain  EKG normal    Lung mass  5.4 by 5.7 cm MASS  Involving suprahilar region right side and LN  Level 7: 14 mm  Plan for Bronch+ EBUS in AM  Consent  NPO after 12 MN    Tobacco abuse  Smoking cessation          Thank you for your consult. I will follow-up with patient. Please contact us if you have any additional questions.     Rakesh Inman MD  Pulmonology  [facility] - Emergency Dept.

## 2022-09-02 NOTE — SUBJECTIVE & OBJECTIVE
No past medical history on file.    No past surgical history on file.    Review of patient's allergies indicates:  No Known Allergies    Family History    None       Tobacco Use    Smoking status: Every Day     Packs/day: 1.00     Types: Cigarettes    Smokeless tobacco: Never   Substance and Sexual Activity    Alcohol use: Yes     Comment: occasional    Drug use: No    Sexual activity: Not on file         Review of Systems   Constitutional:  Positive for fatigue.   Respiratory:  Positive for chest tightness.    Cardiovascular:  Positive for chest pain.   Objective:     Vital Signs (Most Recent):  Temp: 98 °F (36.7 °C) (09/02/22 0956)  Pulse: 94 (09/02/22 1532)  Resp: (!) 31 (09/02/22 1532)  BP: (!) 190/117 (09/02/22 1532)  SpO2: 100 % (09/02/22 1532)   Vital Signs (24h Range):  Temp:  [98 °F (36.7 °C)] 98 °F (36.7 °C)  Pulse:  [72-99] 94  Resp:  [18-31] 31  SpO2:  [96 %-100 %] 100 %  BP: (160-211)/(101-120) 190/117     Weight: 86.7 kg (191 lb 4 oz)  Body mass index is 22.1 kg/m².    No intake or output data in the 24 hours ending 09/02/22 1636    Physical Exam  Vitals and nursing note reviewed.   Constitutional:       Appearance: Normal appearance.   HENT:      Head: Normocephalic and atraumatic.      Mouth/Throat:      Mouth: Mucous membranes are dry.   Eyes:      Pupils: Pupils are equal, round, and reactive to light.   Cardiovascular:      Rate and Rhythm: Normal rate and regular rhythm.      Pulses: Normal pulses.      Heart sounds: Normal heart sounds.   Pulmonary:      Effort: Pulmonary effort is normal.      Breath sounds: Normal breath sounds.   Musculoskeletal:         General: Normal range of motion.      Cervical back: Normal range of motion and neck supple.   Skin:     General: Skin is warm.   Neurological:      General: No focal deficit present.      Mental Status: He is alert.       Vents:       Lines/Drains/Airways       Peripheral Intravenous Line  Duration                  Peripheral IV - Single  Lumen 09/02/22 1120 20 G Posterior;Right Hand <1 day                    Significant Labs:    CBC/Anemia Profile:  Recent Labs   Lab 09/02/22  1101   WBC 7.84   HGB 13.8*   HCT 42.1   *   *   RDW 13.9        Chemistries:  Recent Labs   Lab 09/02/22  1101      K 3.6      CO2 27   BUN 12   CREATININE 1.2   CALCIUM 10.2   ALBUMIN 3.2*   PROT 8.4   BILITOT 0.9   ALKPHOS 138*   ALT 8*   AST 13       ABGs: No results for input(s): PH, PCO2, HCO3, POCSATURATED, BE in the last 48 hours.  Cardiac Markers: No results for input(s): CKMB, TROPONINT, MYOGLOBIN in the last 48 hours.  Coagulation: No results for input(s): PT, INR, APTT in the last 48 hours.  POCT Glucose: No results for input(s): POCTGLUCOSE in the last 48 hours.  Troponin:   Recent Labs   Lab 09/02/22  1101   TROPONINI 0.008     All pertinent labs within the past 24 hours have been reviewed.    Significant Imaging:   I have reviewed all pertinent imaging results/findings within the past 24 hours.    CT Chest With Contrast  Narrative: EXAMINATION:  CT CHEST WITH CONTRAST    CLINICAL HISTORY:  Abnormal xray - lung nodule, >= 1 cm;    TECHNIQUE:  The chest was surveyed from the apices through the posterior costophrenic angles after administration of 100 cc of IV contrast..  Data was reconstructed for multiplanar images in axial, sagittal and coronal planes in for maximal intensity projection images in the axial plane.    COMPARISON:  09/02/2022    FINDINGS:  Base of Neck: No significant abnormality.    Airways: Patent centrally.  Right upper lobe bronchus is occluded by mass.    Lungs: 5.4 x 5.7 cm mass suspected within the right upper lobe with areas of necrosis.  Mass extends into the suprahilar region on the right with enlarged lymph nodes in the right hilum.  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  Additional nodes are seen within the mediastinum measuring 14 mm.   Recommend PET scan and/or tissue sampling.    Pleura: Trace right pleural effusion and dependent changes right lung base.No pleural calcification.    Brandy/Mediastinum: Right hilar adenopathy as well as mediastinal adenopathy concerning for metastatic disease.    Esophagus: Normal.    Heart/pericardium: Normal size.  No pericardial effusion or calcification.    Pulmonary vasculature: Pulmonary arteries distribute normally.  There are four pulmonary veins.    Aorta: Left-sided aortic arch with 3 arterial branches.  The aorta maintains normal caliber, contour and course. There is mild calcification of the thoracic aorta.  There is  mild coronary artery calcification.    Thoracic soft tissues: Normal. Both breasts are present.    Bones: No acute fracture. No suspicious lytic or sclerotic lesion.    Upper Abdomen: Mild nodular thickening the left adrenal gland without discrete mass.  Mild constipation within the large bowel visualized.  Small hiatal hernia.  Impression: 5.4 x 5.7 cm mass suspected within the right upper lobe with areas of necrosis. Mass extends into the suprahilar region on the right with enlarged lymph nodes in the right hilum. 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  Additional nodes are seen within the mediastinum measuring 14 mm.  Recommend pulmonary referral as well as PET scan and/or tissue sampling.    All CT scans at this facility use dose modulation, iterative reconstruction and/or weight based dosing when appropriate to reduce radiation dose to as low as reasonably achievable.    Electronically signed by: Srini Faith MD  Date:    09/02/2022  Time:    13:27  X-Ray Chest 1 View  Narrative: EXAMINATION:  XR CHEST 1 VIEW    CLINICAL HISTORY:  Chest pain, unspecified    TECHNIQUE:  Single frontal view of the chest was performed.    COMPARISON:  Chest radiograph 10/25/2020    FINDINGS:  There is interval development of a large  medial right upper lung mass.There is mild bibasilar interstitial opacity.  Possible trace right pleural fluid.  No significant left pleural fluid.  No pneumothorax.  There is biapical pleuroparenchymal scarring.    The cardiac silhouette is stable.  Thoracic aorta is tortuous.    No acute osseous abnormality.  Impression: New large right suprahilar mass.  Further evaluation with CT chest recommended with intravenous contrast.    Electronically signed by: Roma Bañuelos  Date:    09/02/2022  Time:    10:57

## 2022-09-02 NOTE — HPI
[place] III is 57 y.o.  Asked to see for abn cxr and chest CT  Smoker  Never seen PCP  Presented for chest pain, BP elevated in ER  Imaging reviewed with patient  Location, Size, enlarge level 7 and paratracheal LN: concern for likely Squamous malignancy  No hemoptysis  EKG : NSR  Bronchscopy procedure described to patient

## 2022-09-02 NOTE — H&P
Count includes the [facility] - Emergency Dept.  [facility]  History & Physical    Patient Name: Srinivasan Saucedo III  MRN: [id number]  Patient Class: IP- Inpatient  Admission Date: 9/2/2022  Attending Physician: Mahesh Canchola MD  Primary Care Provider: Primary Doctor No         Patient information was obtained from patient, spouse/SO, past medical records and ER records.     Subjective:     Principal Problem:Lung mass    Chief Complaint:   Chief Complaint   Patient presents with    Lymphadenopathy     Pt complaining of R sided gland swelling and pain x 1 month        HPI:   Lymphadenopathy        Pt complaining of R sided gland swelling and pain x 1 month      Per ER- This is a 57 y.o. male patient who presents to the Emergency Department for evaluation of lymphadenopathy which onset 1 month PTA. Symptoms are constant and moderate in severity. Pt states that he has R sided gland swelling and pain. He also states that he has a burning sensation that comes from stomach and radiates to chest and neck. He has not been able to see anyone about this but has a PCP appointment this upcoming Wednesday with Dr. Vega who he will see for the first time. Associated sxs include neck pain. Patient denies any fever, chills, n/v/d, CP, SOB, and all other sxs at this time. No prior Tx reported. No further complaints or concerns at this time.      Patient evaluated by ER and had Ct scan showed a 5.4 x 5.7 cm mass suspected within the right upper lobe with areas of necrosis.  Mass extends into the suprahilar region on the right with enlarged lymph nodes in the right hilum.  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  Additional nodes are seen within the mediastinum measuring 14 mm. Patient also with HTN Urgency. Patient placed in Observation.      Records reviewed show patient has a Hx of nicotine dependence, chronic pain, and HTN.                Past Medical History:   Diagnosis Date     Hypertension 9/2/2022       History reviewed. No pertinent surgical history.    Review of patient's allergies indicates:  No Known Allergies    No current facility-administered medications on file prior to encounter.     Current Outpatient Medications on File Prior to Encounter   Medication Sig    [DISCONTINUED] gabapentin (NEURONTIN) 300 MG capsule Take 300 mg by mouth 3 (three) times daily.    [DISCONTINUED] methocarbamoL (ROBAXIN) 750 MG Tab Take 500 mg by mouth 4 (four) times daily.    [DISCONTINUED] nabumetone (RELAFEN) 500 MG tablet Take 500 mg by mouth 2 (two) times daily.    [DISCONTINUED] naproxen (NAPROSYN) 375 MG tablet Take 1 tablet (375 mg total) by mouth 2 (two) times daily with meals. Prn pain    [DISCONTINUED] orphenadrine (NORFLEX) 100 mg tablet Take 1 tablet (100 mg total) by mouth 2 (two) times daily.    [DISCONTINUED] traMADol (ULTRAM) 50 mg tablet Take 1 tablet (50 mg total) by mouth every 6 (six) hours as needed for Pain.     Family History       Problem Relation (Age of Onset)    Alcohol abuse Father          Tobacco Use    Smoking status: Every Day     Packs/day: 1.00     Types: Cigarettes    Smokeless tobacco: Never   Substance and Sexual Activity    Alcohol use: Yes     Comment: occasional    Drug use: No    Sexual activity: Yes     Partners: Female     Review of Systems   Constitutional:  Positive for activity change and fatigue. Negative for appetite change and fever.   HENT: Negative.  Negative for congestion, sinus pressure and sore throat.    Eyes: Negative.  Negative for photophobia, discharge and visual disturbance.   Respiratory:  Positive for cough and shortness of breath. Negative for chest tightness and wheezing.    Cardiovascular: Negative.  Negative for chest pain and palpitations.   Gastrointestinal: Negative.  Negative for abdominal pain, blood in stool, constipation, diarrhea, nausea and vomiting.   Endocrine: Negative.    Genitourinary: Negative.     Musculoskeletal:  Positive for myalgias and neck pain. Negative for neck stiffness.   Skin: Negative.    Allergic/Immunologic: Negative.    Neurological:  Positive for weakness. Negative for seizures, syncope and headaches.   Hematological: Negative.    Psychiatric/Behavioral: Negative.  Negative for agitation, behavioral problems, hallucinations, self-injury and suicidal ideas.    Objective:     Vital Signs (Most Recent):  Temp: 98 °F (36.7 °C) (09/02/22 0956)  Pulse: 89 (09/02/22 1635)  Resp: (!) 24 (09/02/22 1635)  BP: (!) 180/98 (09/02/22 1739)  SpO2: 98 % (09/02/22 1635)   Vital Signs (24h Range):  Temp:  [98 °F (36.7 °C)] 98 °F (36.7 °C)  Pulse:  [72-99] 89  Resp:  [18-31] 24  SpO2:  [96 %-100 %] 98 %  BP: (160-211)/() 180/98     Weight: 86.7 kg (191 lb 4 oz)  Body mass index is 22.1 kg/m².    Physical Exam  Vitals and nursing note reviewed.   Constitutional:       General: He is not in acute distress.     Appearance: He is well-developed. He is not toxic-appearing or diaphoretic.   HENT:      Head: Normocephalic and atraumatic.   Eyes:      Pupils: Pupils are equal, round, and reactive to light.   Cardiovascular:      Rate and Rhythm: Normal rate and regular rhythm.      Heart sounds: Normal heart sounds. No murmur heard.  Pulmonary:      Effort: Pulmonary effort is normal. No respiratory distress.      Breath sounds: Normal breath sounds. No wheezing.   Abdominal:      General: Bowel sounds are normal. There is no distension.      Palpations: Abdomen is soft. There is no mass.      Tenderness: There is no abdominal tenderness.   Musculoskeletal:         General: No deformity. Normal range of motion.      Cervical back: Normal range of motion and neck supple. Tenderness present.      Right lower leg: No edema.      Left lower leg: No edema.   Skin:     General: Skin is warm and dry.      Capillary Refill: Capillary refill takes 2 to 3 seconds.   Neurological:      General: No focal deficit present.       Mental Status: He is alert and oriented to person, place, and time. Mental status is at baseline.      Deep Tendon Reflexes: Reflexes are normal and symmetric.   Psychiatric:         Mood and Affect: Mood normal.         Behavior: Behavior normal.         Thought Content: Thought content normal.         Judgment: Judgment normal.         CRANIAL NERVES     CN III, IV, VI   Pupils are equal, round, and reactive to light.     Significant Labs: All pertinent labs within the past 24 hours have been reviewed.  Blood Culture: No results for input(s): LABBLOO in the last 48 hours.  BMP:   Recent Labs   Lab 09/02/22  1101   *      K 3.6      CO2 27   BUN 12   CREATININE 1.2   CALCIUM 10.2     CBC:   Recent Labs   Lab 09/02/22  1101   WBC 7.84   HGB 13.8*   HCT 42.1   *     CMP:   Recent Labs   Lab 09/02/22  1101      K 3.6      CO2 27   *   BUN 12   CREATININE 1.2   CALCIUM 10.2   PROT 8.4   ALBUMIN 3.2*   BILITOT 0.9   ALKPHOS 138*   AST 13   ALT 8*   ANIONGAP 9     Troponin:   Recent Labs   Lab 09/02/22  1101   TROPONINI 0.008     Imaging Results              CT Chest With Contrast (Final result)  Result time 09/02/22 13:27:56      Final result by Srini Faith MD (09/02/22 13:27:56)                   Impression:      5.4 x 5.7 cm mass suspected within the right upper lobe with areas of necrosis. Mass extends into the suprahilar region on the right with enlarged lymph nodes in the right hilum. 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  Additional nodes are seen within the mediastinum measuring 14 mm.  Recommend pulmonary referral as well as PET scan and/or tissue sampling.    All CT scans at this facility use dose modulation, iterative reconstruction and/or weight based dosing when appropriate to reduce radiation dose to as low as reasonably achievable.      Electronically signed by: Srini Faith  MD  Date:    09/02/2022  Time:    13:27               Narrative:    EXAMINATION:  CT CHEST WITH CONTRAST    CLINICAL HISTORY:  Abnormal xray - lung nodule, >= 1 cm;    TECHNIQUE:  The chest was surveyed from the apices through the posterior costophrenic angles after administration of 100 cc of IV contrast..  Data was reconstructed for multiplanar images in axial, sagittal and coronal planes in for maximal intensity projection images in the axial plane.    COMPARISON:  09/02/2022    FINDINGS:  Base of Neck: No significant abnormality.    Airways: Patent centrally.  Right upper lobe bronchus is occluded by mass.    Lungs: 5.4 x 5.7 cm mass suspected within the right upper lobe with areas of necrosis.  Mass extends into the suprahilar region on the right with enlarged lymph nodes in the right hilum.  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  Additional nodes are seen within the mediastinum measuring 14 mm.  Recommend PET scan and/or tissue sampling.    Pleura: Trace right pleural effusion and dependent changes right lung base.No pleural calcification.    Brandy/Mediastinum: Right hilar adenopathy as well as mediastinal adenopathy concerning for metastatic disease.    Esophagus: Normal.    Heart/pericardium: Normal size.  No pericardial effusion or calcification.    Pulmonary vasculature: Pulmonary arteries distribute normally.  There are four pulmonary veins.    Aorta: Left-sided aortic arch with 3 arterial branches.  The aorta maintains normal caliber, contour and course. There is mild calcification of the thoracic aorta.  There is  mild coronary artery calcification.    Thoracic soft tissues: Normal. Both breasts are present.    Bones: No acute fracture. No suspicious lytic or sclerotic lesion.    Upper Abdomen: Mild nodular thickening the left adrenal gland without discrete mass.  Mild constipation within the large bowel visualized.  Small hiatal  hernia.                                       X-Ray Chest 1 View (Final result)  Result time 09/02/22 10:57:56      Final result by Roma Bañuelos MD (09/02/22 10:57:56)                   Impression:      New large right suprahilar mass.  Further evaluation with CT chest recommended with intravenous contrast.      Electronically signed by: Roma Bañuelos  Date:    09/02/2022  Time:    10:57               Narrative:    EXAMINATION:  XR CHEST 1 VIEW    CLINICAL HISTORY:  Chest pain, unspecified    TECHNIQUE:  Single frontal view of the chest was performed.    COMPARISON:  Chest radiograph 10/25/2020    FINDINGS:  There is interval development of a large medial right upper lung mass.There is mild bibasilar interstitial opacity.  Possible trace right pleural fluid.  No significant left pleural fluid.  No pneumothorax.  There is biapical pleuroparenchymal scarring.    The cardiac silhouette is stable.  Thoracic aorta is tortuous.    No acute osseous abnormality.                                        Significant Imaging: I have reviewed all pertinent imaging results/findings within the past 24 hours.    Assessment/Plan:     * Right Lung mass with trace right pleural effusion  Telemetry  Consult Pulmonology  NPO after midnight for possible  brochoscopy with Bx tomorrow  Patient discussed with Pulmonology and does not require Abx at this time      CAD (coronary artery disease)- Mild noted on CT scan  Will need outpatient follow up       Hypoalbuminemia  Monitor      Thrombocytosis  Monitor      Hypertensive urgency  Blood pressure better after Iv hydralazine and po clonidine in ER  Start lisinopril  Add prn hydralazine    Nicotine dependence  Smoking cessation education > 3 minutes  Add nicotine patch      VTE Risk Mitigation (From admission, onward)         Ordered     IP VTE HIGH RISK PATIENT  Once         09/02/22 1752     Place sequential compression device  Until discontinued         09/02/22 1752     Place  YNES hose  Until discontinued         09/02/22 1752     Reason for No Pharmacological VTE Prophylaxis  Once        Question:  Reasons:  Answer:  Risk of Bleeding    09/02/22 1752                   Mahesh Canchola MD  Department of Hospital Medicine   [facility] - Emergency Dept.

## 2022-09-03 ENCOUNTER — ANESTHESIA EVENT (OUTPATIENT)
Dept: ENDOSCOPY | Facility: HOSPITAL | Age: 57
DRG: 206 | End: 2022-09-03
Payer: COMMERCIAL

## 2022-09-03 ENCOUNTER — ANESTHESIA (OUTPATIENT)
Dept: ENDOSCOPY | Facility: HOSPITAL | Age: 57
DRG: 206 | End: 2022-09-03
Payer: COMMERCIAL

## 2022-09-03 VITALS
DIASTOLIC BLOOD PRESSURE: 77 MMHG | HEIGHT: 78 IN | SYSTOLIC BLOOD PRESSURE: 133 MMHG | TEMPERATURE: 100 F | WEIGHT: 186 LBS | HEART RATE: 91 BPM | RESPIRATION RATE: 18 BRPM | OXYGEN SATURATION: 97 % | BODY MASS INDEX: 21.52 KG/M2

## 2022-09-03 PROBLEM — I16.0 HYPERTENSIVE URGENCY: Status: RESOLVED | Noted: 2022-09-02 | Resolved: 2022-09-03

## 2022-09-03 PROCEDURE — 27200944 HC BRONCH FORCEPS DISPOSABLE: Performed by: INTERNAL MEDICINE

## 2022-09-03 PROCEDURE — 31628 BRONCHOSCOPY/LUNG BX EACH: CPT | Mod: 59,RT | Performed by: INTERNAL MEDICINE

## 2022-09-03 PROCEDURE — 31628 PR BRONCHOSCOPY,TRANSBRONCH BIOPSY: ICD-10-PCS | Mod: 59,RT,, | Performed by: INTERNAL MEDICINE

## 2022-09-03 PROCEDURE — 27200946 HC BRUSH, CYTOLOGY: Performed by: INTERNAL MEDICINE

## 2022-09-03 PROCEDURE — 31652 BRONCH EBUS SAMPLNG 1/2 NODE: CPT | Mod: ,,, | Performed by: INTERNAL MEDICINE

## 2022-09-03 PROCEDURE — 31623 DX BRONCHOSCOPE/BRUSH: CPT | Mod: RT | Performed by: INTERNAL MEDICINE

## 2022-09-03 PROCEDURE — 27202059 HC NEEDLE, FNA (ANY): Performed by: INTERNAL MEDICINE

## 2022-09-03 PROCEDURE — 25000003 PHARM REV CODE 250: Performed by: NURSE PRACTITIONER

## 2022-09-03 PROCEDURE — 31623 PR BRONCHOSCOPY,DIAGNOSTIC W BRUSH: ICD-10-PCS | Mod: 51,RT,, | Performed by: INTERNAL MEDICINE

## 2022-09-03 PROCEDURE — 99232 SBSQ HOSP IP/OBS MODERATE 35: CPT | Mod: 25,,, | Performed by: INTERNAL MEDICINE

## 2022-09-03 PROCEDURE — 31623 DX BRONCHOSCOPE/BRUSH: CPT | Mod: 51,RT,, | Performed by: INTERNAL MEDICINE

## 2022-09-03 PROCEDURE — 99232 PR SUBSEQUENT HOSPITAL CARE,LEVL II: ICD-10-PCS | Mod: 25,,, | Performed by: INTERNAL MEDICINE

## 2022-09-03 PROCEDURE — 25000003 PHARM REV CODE 250: Performed by: NURSE ANESTHETIST, CERTIFIED REGISTERED

## 2022-09-03 PROCEDURE — 31652 BRONCH EBUS SAMPLNG 1/2 NODE: CPT | Performed by: INTERNAL MEDICINE

## 2022-09-03 PROCEDURE — 63600175 PHARM REV CODE 636 W HCPCS: Performed by: NURSE ANESTHETIST, CERTIFIED REGISTERED

## 2022-09-03 PROCEDURE — 37000008 HC ANESTHESIA 1ST 15 MINUTES: Performed by: INTERNAL MEDICINE

## 2022-09-03 PROCEDURE — 31652 PR BRONCH W/ EBUS, SAMPLING 1 OR 2 NODES, INCL GUIDE: ICD-10-PCS | Mod: ,,, | Performed by: INTERNAL MEDICINE

## 2022-09-03 PROCEDURE — 31628 BRONCHOSCOPY/LUNG BX EACH: CPT | Mod: 59,RT,, | Performed by: INTERNAL MEDICINE

## 2022-09-03 PROCEDURE — 37000009 HC ANESTHESIA EA ADD 15 MINS: Performed by: INTERNAL MEDICINE

## 2022-09-03 RX ORDER — SODIUM CHLORIDE 9 MG/ML
INJECTION, SOLUTION INTRAVENOUS CONTINUOUS
Status: DISCONTINUED | OUTPATIENT
Start: 2022-09-03 | End: 2022-09-03

## 2022-09-03 RX ORDER — SODIUM CHLORIDE, SODIUM LACTATE, POTASSIUM CHLORIDE, CALCIUM CHLORIDE 600; 310; 30; 20 MG/100ML; MG/100ML; MG/100ML; MG/100ML
INJECTION, SOLUTION INTRAVENOUS CONTINUOUS PRN
Status: DISCONTINUED | OUTPATIENT
Start: 2022-09-03 | End: 2022-09-03

## 2022-09-03 RX ORDER — LIDOCAINE HYDROCHLORIDE 40 MG/ML
4 SOLUTION TOPICAL ONCE
Status: DISCONTINUED | OUTPATIENT
Start: 2022-09-03 | End: 2022-09-03

## 2022-09-03 RX ORDER — LIDOCAINE HYDROCHLORIDE 20 MG/ML
2 INJECTION, SOLUTION INFILTRATION; PERINEURAL ONCE
Status: DISCONTINUED | OUTPATIENT
Start: 2022-09-03 | End: 2022-09-03

## 2022-09-03 RX ORDER — PROPOFOL 10 MG/ML
VIAL (ML) INTRAVENOUS
Status: DISCONTINUED | OUTPATIENT
Start: 2022-09-03 | End: 2022-09-03

## 2022-09-03 RX ORDER — ENALAPRIL MALEATE 20 MG/1
20 TABLET ORAL DAILY
Qty: 90 TABLET | Refills: 3 | OUTPATIENT
Start: 2022-09-04 | End: 2022-09-03 | Stop reason: SDUPTHER

## 2022-09-03 RX ORDER — MORPHINE SULFATE 2 MG/ML
2 INJECTION, SOLUTION INTRAMUSCULAR; INTRAVENOUS EVERY 4 HOURS PRN
Status: DISCONTINUED | OUTPATIENT
Start: 2022-09-03 | End: 2022-09-03 | Stop reason: HOSPADM

## 2022-09-03 RX ORDER — ENALAPRIL MALEATE 20 MG/1
20 TABLET ORAL DAILY
Qty: 30 TABLET | Refills: 0 | Status: SHIPPED | OUTPATIENT
Start: 2022-09-04 | End: 2022-09-19 | Stop reason: SDUPTHER

## 2022-09-03 RX ORDER — SUCCINYLCHOLINE CHLORIDE 20 MG/ML
INJECTION INTRAMUSCULAR; INTRAVENOUS
Status: DISCONTINUED | OUTPATIENT
Start: 2022-09-03 | End: 2022-09-03

## 2022-09-03 RX ADMIN — SUCCINYLCHOLINE CHLORIDE 200 MG: 20 INJECTION, SOLUTION INTRAMUSCULAR; INTRAVENOUS at 08:09

## 2022-09-03 RX ADMIN — ESMOLOL HYDROCHLORIDE 40 MG: 10 INJECTION INTRAVENOUS at 08:09

## 2022-09-03 RX ADMIN — ESMOLOL HYDROCHLORIDE 20 MG: 10 INJECTION INTRAVENOUS at 08:09

## 2022-09-03 RX ADMIN — SODIUM CHLORIDE, SODIUM LACTATE, POTASSIUM CHLORIDE, AND CALCIUM CHLORIDE: .6; .31; .03; .02 INJECTION, SOLUTION INTRAVENOUS at 08:09

## 2022-09-03 RX ADMIN — ACETAMINOPHEN 650 MG: 325 TABLET ORAL at 03:09

## 2022-09-03 RX ADMIN — LISINOPRIL 20 MG: 20 TABLET ORAL at 11:09

## 2022-09-03 RX ADMIN — PROPOFOL 200 MG: 10 INJECTION, EMULSION INTRAVENOUS at 08:09

## 2022-09-03 NOTE — PROGRESS NOTES
[facility] - Telemetry ([facility]  Pulmonology  Progress Note    Patient Name: Srinivasan Saucedo III  MRN: [id number]  Admission Date: 9/2/2022  Hospital Length of Stay: 1 days  Code Status: Prior  Attending Provider: Mahesh Canchola MD  Primary Care Provider: Primary Doctor No   Principal Problem: Lung mass    Subjective:     Interval History:   09/03/2022 Seen and examined: tolerated procedure, some post op nausea. Results explained, CXR revewied    Respiratory ROS: no cough, shortness of breath, or wheezing     Objective:     Vital Signs (Most Recent):  Temp: 98.7 °F (37.1 °C) (09/03/22 1107)  Pulse: 87 (09/03/22 1107)  Resp: 18 (09/03/22 1107)  BP: 124/83 (09/03/22 1107)  SpO2: 98 % (09/03/22 1107) Vital Signs (24h Range):  Temp:  [97.4 °F (36.3 °C)-98.9 °F (37.2 °C)] 98.7 °F (37.1 °C)  Pulse:  [] 87  Resp:  [16-31] 18  SpO2:  [95 %-100 %] 98 %  BP: (119-190)/() 124/83     Weight: 84.4 kg (186 lb)  Body mass index is 21.49 kg/m².      Intake/Output Summary (Last 24 hours) at 9/3/2022 1426  Last data filed at 9/3/2022 0931  Gross per 24 hour   Intake 500 ml   Output --   Net 500 ml       Physical Exam  Vitals and nursing note reviewed.   Constitutional:       Appearance: Normal appearance.   HENT:      Head: Normocephalic and atraumatic.      Mouth/Throat:      Mouth: Mucous membranes are dry.   Eyes:      Pupils: Pupils are equal, round, and reactive to light.   Cardiovascular:      Rate and Rhythm: Normal rate and regular rhythm.      Pulses: Normal pulses.      Heart sounds: Normal heart sounds.   Pulmonary:      Effort: Pulmonary effort is normal.      Breath sounds: Normal breath sounds.   Musculoskeletal:         General: Normal range of motion.      Cervical back: Normal range of motion and neck supple.   Skin:     General: Skin is warm.   Neurological:      General: No focal deficit present.      Mental Status: He is alert.       Vents:       Lines/Drains/Airways       Peripheral Intravenous Line   Duration                  Peripheral IV - Single Lumen 09/02/22 1120 20 G Posterior;Right Hand 1 day                    Significant Labs:    CBC/Anemia Profile:  Recent Labs   Lab 09/02/22  1101   WBC 7.84   HGB 13.8*   HCT 42.1   *   *   RDW 13.9        Chemistries:  Recent Labs   Lab 09/02/22  1101      K 3.6      CO2 27   BUN 12   CREATININE 1.2   CALCIUM 10.2   ALBUMIN 3.2*   PROT 8.4   BILITOT 0.9   ALKPHOS 138*   ALT 8*   AST 13       ABGs: No results for input(s): PH, PCO2, HCO3, POCSATURATED, BE in the last 48 hours.  POCT Glucose: No results for input(s): POCTGLUCOSE in the last 48 hours.  Respiratory Culture: No results for input(s): GSRESP, RESPIRATORYC in the last 48 hours.  Urine Culture: No results for input(s): LABURIN in the last 48 hours.  All pertinent labs within the past 24 hours have been reviewed.    Significant Imaging:  I have reviewed all pertinent imaging results/findings within the past 24 hours.    X-Ray Chest 1 View  Narrative: EXAMINATION:  XR CHEST 1 VIEW    CLINICAL HISTORY:  Post Bronchoscopy;  right upper lobe lung mass.    COMPARISON:  09/02/2022    FINDINGS:  Opacity right upper lobe compatible patient's history of lung mass.  No significant interval change.  Negative for pneumothorax.  Left lung clear.  Heart size within normal limits.No significant bony findings.  Impression: Status post bronchoscopy.  Negative for pneumothorax.    Electronically signed by: Srinivasan Tomlin  Date:    09/03/2022  Time:    10:22       ABG  No results for input(s): PH, PO2, PCO2, HCO3, BE in the last 168 hours.  Assessment/Plan:     * Right Lung mass with trace right pleural effusion  5.4 by 5.7 cm MASS  Involving suprahilar region right side and LN  Level 7: 14 mm  SP Bronch+ EBUS in AM  Endobronchial lesion RUL seen  TBBX and cytology brushes  Tolerated well  Follow in office for results next week    Nicotine dependence  Smoking cessation    Diallo Cameron  MD Storm  Pulmonology  [facility] - Telemetry ([facility])

## 2022-09-03 NOTE — NURSING
"Tele monitor removed and returned to monitor tech.  Discharge instructions reviewed with patient including discharge medications, follow-up appointments, diet, and activity restrictions.  Patient verbalizes understanding and voices no concerns.  Discharged home via wheelchair to private vehicle in no acute distress.    Printed prescription supplied to patient.        /77 (BP Location: Left arm, Patient Position: Lying)   Pulse 91   Temp 99.8 °F (37.7 °C) (Oral)   Resp 18   Ht 6' 6" (1.981 m)   Wt 84.4 kg (186 lb)   SpO2 97%   BMI 21.49 kg/m²    "

## 2022-09-03 NOTE — DISCHARGE SUMMARY
[facility] ([facility] Medicine  Discharge Summary      Patient Name: Srinivasan Saucedo III  MRN: [id number]  Patient Class: IP- Inpatient  Admission Date: 9/2/2022  Hospital Length of Stay: 1 days  Discharge Date and Time: No discharge date for patient encounter.  Attending Physician: Mahesh Canchola MD   Discharging Provider: Mahesh Canchola MD  Primary Care Provider: Primary Doctor No      HPI:   Lymphadenopathy        Pt complaining of R sided gland swelling and pain x 1 month      Per ER- This is a 57 y.o. male patient who presents to the Emergency Department for evaluation of lymphadenopathy which onset 1 month PTA. Symptoms are constant and moderate in severity. Pt states that he has R sided gland swelling and pain. He also states that he has a burning sensation that comes from stomach and radiates to chest and neck. He has not been able to see anyone about this but has a PCP appointment this upcoming Wednesday with Dr. Vega who he will see for the first time. Associated sxs include neck pain. Patient denies any fever, chills, n/v/d, CP, SOB, and all other sxs at this time. No prior Tx reported. No further complaints or concerns at this time.      Patient evaluated by ER and had Ct scan showed a 5.4 x 5.7 cm mass suspected within the right upper lobe with areas of necrosis.  Mass extends into the suprahilar region on the right with enlarged lymph nodes in the right hilum.  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  Additional nodes are seen within the mediastinum measuring 14 mm. Patient also with HTN Urgency. Patient placed in Observation.      Records reviewed show patient has a Hx of nicotine dependence, chronic pain, and HTN.                Procedure(s) (LRB):  ENDOBRONCHIAL ULTRASOUND (EBUS) (Bilateral)      Hospital Course:   Patient 58 yo male admitted to hospital with R Lung Mass. Patient evaluated by Pulmonary who  recommended BRONCH - EBUS. Patient stable pending procedure today. No events. Discussed POC at bedside with family. Await studies.  Patient underwent bronchoscopy with biopsy. Patient tolerated procedure without complication. Patient stable for a safe discharge to home awaiting biopsy result, follow up with Pulmonary CC.       Goals of Care Treatment Preferences:  Code Status: Full Code      Consults:   Consults (From admission, onward)        Status Ordering Provider     Inpatient consult to Pulmonology  Once        Provider:  Rakesh Inman MD    Completed JUAQUIN KIMBROUGH     Inpatient consult to Pulmonology  Once        Provider:  Rakesh Inman MD    Completed JOHN WHEELER new Assessment & Plan notes have been filed under this hospital service since the last note was generated.  Service: Hospital Medicine    Final Active Diagnoses:    Diagnosis Date Noted POA    PRINCIPAL PROBLEM:  Right Lung mass with trace right pleural effusion [R91.8] 09/02/2022 Yes    Thrombocytosis [D75.839] 09/02/2022 Yes    Hypoalbuminemia [E88.09] 09/02/2022 Yes    CAD (coronary artery disease)- Mild noted on CT scan [I25.10] 09/02/2022 Yes    Nicotine dependence [F17.200] 10/25/2020 Yes      Problems Resolved During this Admission:    Diagnosis Date Noted Date Resolved POA    Hypertensive urgency [I16.0] 09/02/2022 09/03/2022 Yes       Discharged Condition: stable    Disposition: Home or Self Care    Follow Up:   Follow-up Information     Rakesh Inman MD Follow up in 1 week(s).    Specialty: Critical Care Medicine  Contact information:  [address]  [place] [address] [phone]                       Patient Instructions:      Diet Cardiac     Activity as tolerated       Significant Diagnostic Studies: Labs:   BMP:   Recent Labs   Lab 09/02/22  1101   *      K 3.6      CO2 27   BUN 12   CREATININE 1.2   CALCIUM 10.2   , CMP   Recent Labs   Lab 09/02/22  1101       K 3.6      CO2 27   *   BUN 12   CREATININE 1.2   CALCIUM 10.2   PROT 8.4   ALBUMIN 3.2*   BILITOT 0.9   ALKPHOS 138*   AST 13   ALT 8*   ANIONGAP 9   , CBC   Recent Labs   Lab 09/02/22  1101   WBC 7.84   HGB 13.8*   HCT 42.1   *   , Troponin   Recent Labs   Lab 09/02/22  1101   TROPONINI 0.008    and All labs within the past 24 hours have been reviewed    Pending Diagnostic Studies:     Procedure Component Value Units Date/Time    Cytology-FNA Non-Radiology Clinician Performed w/o on site [[id number]] Collected: 09/03/22 0935    Order Status: Sent Lab Status: In process Updated: 09/03/22 0935         Medications:  Reconciled Home Medications:      Medication List      START taking these medications    enalapril 20 MG tablet  Commonly known as: VASOTEC  Take 1 tablet (20 mg total) by mouth once daily.  Start taking on: September 4, 2022            Indwelling Lines/Drains at time of discharge:   Lines/Drains/Airways     None                 Time spent on the discharge of patient: 32 minutes         Mahesh Canchola MD  Department of Hospital Medicine  [facility] - Telemetry ([facility])

## 2022-09-03 NOTE — ANESTHESIA POSTPROCEDURE EVALUATION
Anesthesia Post Evaluation    Patient: Srinivasan Saucedo III    Procedure(s) Performed: Procedure(s) (LRB):  ENDOBRONCHIAL ULTRASOUND (EBUS) (Bilateral)    Final Anesthesia Type: general      Patient location during evaluation: GI PACU  Patient participation: Yes- Able to Participate  Level of consciousness: awake and alert and awake  Post-procedure vital signs: reviewed and stable  Pain management: adequate  Airway patency: patent  ERIN mitigation strategies: Multimodal analgesia  PONV status at discharge: No PONV  Anesthetic complications: no      Cardiovascular status: blood pressure returned to baseline and hemodynamically stable  Respiratory status: unassisted and spontaneous ventilation  Hydration status: euvolemic  Follow-up not needed.          Vitals Value Taken Time   /94 09/03/22 0945   Temp 36.4 °C (97.6 °F) 09/03/22 0945   Pulse 92 09/03/22 0945   Resp 16 09/03/22 0945   SpO2 95 % 09/03/22 0945         No case tracking events are documented in the log.      Pain/Charlie Score: Charlie Score: 10 (9/3/2022  9:49 AM)

## 2022-09-03 NOTE — BRIEF OP NOTE
[facility] - Telemetry (Hospital)  Discharge Note  Short Stay    Procedure(s) (LRB):  ENDOBRONCHIAL ULTRASOUND (EBUS) (Bilateral)    OUTCOME: Patient tolerated treatment/procedure well without complication and is now ready for discharge.    DISPOSITION: Admitted as an Inpatient    FINAL DIAGNOSIS:  Lung mass    FOLLOWUP:  tomorrow    DISCHARGE INSTRUCTIONS:  No discharge procedures on file.     TIME SPENT ON DISCHARGE: 10 minutes      Bronchoscopy performed   Large mass proximal orifice RUL posterior  90% occlusion  Not traversed  Large polypoid, bleeds  Biopsy, Brushes obtained  - The 10R (hilar) node was 16.6 mm by EBUS. 4 passess       - The 4R (lower paratracheal) node was 20.4 mm by EBUS.3 passes's    Minimal blood loss  Extubated post procedures        Brushes for cytology  TBBX for histo  Cytology specimen 4R and 10 R

## 2022-09-03 NOTE — ANESTHESIA PREPROCEDURE EVALUATION
09/03/2022  Srinivasan Saucedo III is a 57 y.o., male.  who presents to the Emergency Department for evaluation of lymphadenopathy which onset 1 month PTA. Symptoms are constant and moderate in severity. Pt states that he has R sided gland swelling and pain.       Patient evaluated by ER and had Ct scan showed a 5.4 x 5.7 cm mass suspected within the right upper lobe with areas of necrosis.    Lab Results   Component Value Date    WBC 7.84 09/02/2022    HGB 13.8 (L) 09/02/2022    HCT 42.1 09/02/2022     (H) 09/02/2022     (H) 09/02/2022       Pre-op Assessment    I have reviewed the Patient Summary Reports.    I have reviewed the NPO Status.   I have reviewed the Medications.     Review of Systems  Anesthesia Hx:  Denies Family Hx of Anesthesia complications.   Denies Personal Hx of Anesthesia complications.   Social:  Smoker    Cardiovascular:   Exercise tolerance: good Hypertension CAD (Noted on CT, Mild. )      Pulmonary:   5.4 x 5.7 cm mass suspected within the right upper lobe with areas of necrosis.  Mass extends into the suprahilar region on the right with enlarged lymph nodes in the right hilum.  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  Additional nodes are seen within the mediastinum measuring 14 mm   Renal/:  Renal/ Normal     Hepatic/GI:  Hepatic/GI Normal    Neurological:  Neurology Normal    Endocrine:  Endocrine Normal        Physical Exam  General: Well nourished    Airway:  Mallampati: II   Mouth Opening: Normal  Neck ROM: Normal ROM    Dental:  Intact      Vitals:    09/03/22 0703   BP: (!) 144/90   Pulse: 76   Resp: 18   Temp: 37.1 °C (98.8 °F)       Anesthesia Plan  Type of Anesthesia, risks & benefits discussed:    Anesthesia Type: Gen ETT  Intra-op Monitoring Plan: Standard ASA Monitors  Induction:  IV  Airway  Plan: Direct  Informed Consent: Informed consent signed with the Patient and all parties understand the risks and agree with anesthesia plan.  All questions answered.   ASA Score: 3    Ready For Surgery From Anesthesia Perspective.     .

## 2022-09-03 NOTE — ASSESSMENT & PLAN NOTE
Blood pressure better after Iv hydralazine and po clonidine in ER  Start lisinopril  Add prn hydralazine  Improved

## 2022-09-03 NOTE — OP NOTE
Bronchoscopy performed   Large mass proximal orifice RUL posterior  90% occlusion  Not traversed  Large polypoid, bleeds  Biopsy, Brushes obtained  - The 10R (hilar) node was 16.6 mm by EBUS. 4 passess       - The 4R (lower paratracheal) node was 20.4 mm by EBUS.3 passes's    Minimal blood loss  Extubated post procedures        Brushes for cytology  TBBX for histo  Cytology specimen 4R and 10 R

## 2022-09-03 NOTE — SUBJECTIVE & OBJECTIVE
Interval History: Patient 58 yo male admitted to hospital with R Lung Mass. Patient evaluated by Pulmonary who recommended BRONCH - EBUS. Patient stable pending procedure today. No events. Discussed POC at bedside with family. Await studies.    Review of Systems   Constitutional:  Positive for activity change and fatigue. Negative for appetite change and fever.   HENT: Negative.  Negative for congestion, sinus pressure and sore throat.    Eyes: Negative.  Negative for photophobia, discharge and visual disturbance.   Respiratory:  Positive for cough and shortness of breath. Negative for chest tightness and wheezing.    Cardiovascular: Negative.  Negative for chest pain and palpitations.   Gastrointestinal: Negative.  Negative for abdominal pain, blood in stool, constipation, diarrhea, nausea and vomiting.   Endocrine: Negative.    Genitourinary: Negative.    Musculoskeletal:  Positive for myalgias and neck pain. Negative for neck stiffness.   Skin: Negative.    Allergic/Immunologic: Negative.    Neurological:  Positive for weakness. Negative for seizures, syncope and headaches.   Hematological: Negative.    Psychiatric/Behavioral: Negative.  Negative for agitation, behavioral problems, hallucinations, self-injury and suicidal ideas.    Objective:     Vital Signs (Most Recent):  Temp: 98.6 °F (37 °C) (09/03/22 0817)  Pulse: 77 (09/03/22 0817)  Resp: 16 (09/03/22 0817)  BP: (!) 135/95 (09/03/22 0817)  SpO2: 98 % (09/03/22 0817)   Vital Signs (24h Range):  Temp:  [97.4 °F (36.3 °C)-98.9 °F (37.2 °C)] 98.6 °F (37 °C)  Pulse:  [] 77  Resp:  [16-31] 16  SpO2:  [96 %-100 %] 98 %  BP: (119-211)/() 135/95     Weight: 84.4 kg (186 lb)  Body mass index is 21.49 kg/m².  No intake or output data in the 24 hours ending 09/03/22 0931   Physical Exam  Vitals and nursing note reviewed.   Constitutional:       General: He is not in acute distress.     Appearance: He is well-developed. He is not toxic-appearing or  diaphoretic.   HENT:      Head: Normocephalic and atraumatic.   Eyes:      Pupils: Pupils are equal, round, and reactive to light.   Cardiovascular:      Rate and Rhythm: Normal rate and regular rhythm.      Heart sounds: Normal heart sounds. No murmur heard.  Pulmonary:      Effort: Pulmonary effort is normal. No respiratory distress.      Breath sounds: Normal breath sounds. No wheezing.   Abdominal:      General: Bowel sounds are normal. There is no distension.      Palpations: Abdomen is soft. There is no mass.      Tenderness: There is no abdominal tenderness.   Musculoskeletal:         General: No deformity. Normal range of motion.      Cervical back: Normal range of motion and neck supple. Tenderness present.      Right lower leg: No edema.      Left lower leg: No edema.   Skin:     General: Skin is warm and dry.      Capillary Refill: Capillary refill takes 2 to 3 seconds.   Neurological:      General: No focal deficit present.      Mental Status: He is alert and oriented to person, place, and time. Mental status is at baseline.      Deep Tendon Reflexes: Reflexes are normal and symmetric.   Psychiatric:         Mood and Affect: Mood normal.         Behavior: Behavior normal.         Thought Content: Thought content normal.         Judgment: Judgment normal.       Significant Labs: All pertinent labs within the past 24 hours have been reviewed.  BMP:   Recent Labs   Lab 09/02/22  1101   *      K 3.6      CO2 27   BUN 12   CREATININE 1.2   CALCIUM 10.2     CBC:   Recent Labs   Lab 09/02/22  1101   WBC 7.84   HGB 13.8*   HCT 42.1   *     CMP:   Recent Labs   Lab 09/02/22  1101      K 3.6      CO2 27   *   BUN 12   CREATININE 1.2   CALCIUM 10.2   PROT 8.4   ALBUMIN 3.2*   BILITOT 0.9   ALKPHOS 138*   AST 13   ALT 8*   ANIONGAP 9     Lipase: No results for input(s): LIPASE in the last 48 hours.  Troponin:   Recent Labs   Lab 09/02/22  1101   TROPONINI 0.008      Imaging Results              CT Chest With Contrast (Final result)  Result time 09/02/22 13:27:56      Final result by Srini Faith MD (09/02/22 13:27:56)                   Impression:      5.4 x 5.7 cm mass suspected within the right upper lobe with areas of necrosis. Mass extends into the suprahilar region on the right with enlarged lymph nodes in the right hilum. 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  Additional nodes are seen within the mediastinum measuring 14 mm.  Recommend pulmonary referral as well as PET scan and/or tissue sampling.    All CT scans at this facility use dose modulation, iterative reconstruction and/or weight based dosing when appropriate to reduce radiation dose to as low as reasonably achievable.      Electronically signed by: Srini Faith MD  Date:    09/02/2022  Time:    13:27               Narrative:    EXAMINATION:  CT CHEST WITH CONTRAST    CLINICAL HISTORY:  Abnormal xray - lung nodule, >= 1 cm;    TECHNIQUE:  The chest was surveyed from the apices through the posterior costophrenic angles after administration of 100 cc of IV contrast..  Data was reconstructed for multiplanar images in axial, sagittal and coronal planes in for maximal intensity projection images in the axial plane.    COMPARISON:  09/02/2022    FINDINGS:  Base of Neck: No significant abnormality.    Airways: Patent centrally.  Right upper lobe bronchus is occluded by mass.    Lungs: 5.4 x 5.7 cm mass suspected within the right upper lobe with areas of necrosis.  Mass extends into the suprahilar region on the right with enlarged lymph nodes in the right hilum.  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  Additional nodes are seen within the mediastinum measuring 14 mm.  Recommend PET scan and/or tissue sampling.    Pleura: Trace right pleural effusion and dependent changes right  lung base.No pleural calcification.    Brandy/Mediastinum: Right hilar adenopathy as well as mediastinal adenopathy concerning for metastatic disease.    Esophagus: Normal.    Heart/pericardium: Normal size.  No pericardial effusion or calcification.    Pulmonary vasculature: Pulmonary arteries distribute normally.  There are four pulmonary veins.    Aorta: Left-sided aortic arch with 3 arterial branches.  The aorta maintains normal caliber, contour and course. There is mild calcification of the thoracic aorta.  There is  mild coronary artery calcification.    Thoracic soft tissues: Normal. Both breasts are present.    Bones: No acute fracture. No suspicious lytic or sclerotic lesion.    Upper Abdomen: Mild nodular thickening the left adrenal gland without discrete mass.  Mild constipation within the large bowel visualized.  Small hiatal hernia.                                       X-Ray Chest 1 View (Final result)  Result time 09/02/22 10:57:56      Final result by Roma Bañuelos MD (09/02/22 10:57:56)                   Impression:      New large right suprahilar mass.  Further evaluation with CT chest recommended with intravenous contrast.      Electronically signed by: Roma Bañuelos  Date:    09/02/2022  Time:    10:57               Narrative:    EXAMINATION:  XR CHEST 1 VIEW    CLINICAL HISTORY:  Chest pain, unspecified    TECHNIQUE:  Single frontal view of the chest was performed.    COMPARISON:  Chest radiograph 10/25/2020    FINDINGS:  There is interval development of a large medial right upper lung mass.There is mild bibasilar interstitial opacity.  Possible trace right pleural fluid.  No significant left pleural fluid.  No pneumothorax.  There is biapical pleuroparenchymal scarring.    The cardiac silhouette is stable.  Thoracic aorta is tortuous.    No acute osseous abnormality.                                        Significant Imaging: I have reviewed all pertinent imaging results/findings  within the past 24 hours.

## 2022-09-03 NOTE — INTERVAL H&P NOTE
The patient has been examined and the H&P has been reviewed:    I concur with the findings and no changes have occurred since H&P was written.    Surgery risks, benefits and alternative options discussed and understood by patient/family.          Active Hospital Problems    Diagnosis  POA    *Right Lung mass with trace right pleural effusion [R91.8]  Yes    Hypertensive urgency [I16.0]  Yes    Thrombocytosis [D75.839]  Yes    Hypoalbuminemia [E88.09]  Yes    CAD (coronary artery disease)- Mild noted on CT scan [I25.10]  Yes    Nicotine dependence [F17.200]  Yes      Resolved Hospital Problems   No resolved problems to display.

## 2022-09-03 NOTE — TRANSFER OF CARE
"Anesthesia Transfer of Care Note    Patient: Srinivasan Saucedo III    Procedure(s) Performed: Procedure(s) (LRB):  ENDOBRONCHIAL ULTRASOUND (EBUS) (Bilateral)    Patient location: GI    Anesthesia Type: general    Transport from OR: Transported from OR on room air with adequate spontaneous ventilation    Post pain: adequate analgesia    Post assessment: no apparent anesthetic complications and tolerated procedure well    Post vital signs: stable    Level of consciousness: awake and alert    Nausea/Vomiting: no nausea/vomiting    Complications: none    Transfer of care protocol was followed      Last vitals:   Visit Vitals  BP (!) 128/94 (BP Location: Left arm, Patient Position: Lying)   Pulse 92   Temp 36.4 °C (97.6 °F) (Temporal)   Resp 16   Ht 6' 6" (1.981 m)   Wt 84.4 kg (186 lb)   SpO2 95%   BMI 21.49 kg/m²     "

## 2022-09-03 NOTE — ASSESSMENT & PLAN NOTE
5.4 by 5.7 cm MASS  Involving suprahilar region right side and LN  Level 7: 14 mm  SP Bronch+ EBUS in AM  Endobronchial lesion RUL seen  TBBX and cytology brushes  Tolerated well  Follow in office for results next week

## 2022-09-03 NOTE — PROGRESS NOTES
[facility] Medicine  Progress Note    Patient Name: Srinivasan Saucedo III  MRN: [id number]  Patient Class: IP- Inpatient   Admission Date: 9/2/2022  Length of Stay: 1 days  Attending Physician: Mahesh Canchola MD  Primary Care Provider: Primary Doctor No        Subjective:     Principal Problem:Lung mass        HPI:  Lymphadenopathy        Pt complaining of R sided gland swelling and pain x 1 month      Per ER- This is a 57 y.o. male patient who presents to the Emergency Department for evaluation of lymphadenopathy which onset 1 month PTA. Symptoms are constant and moderate in severity. Pt states that he has R sided gland swelling and pain. He also states that he has a burning sensation that comes from stomach and radiates to chest and neck. He has not been able to see anyone about this but has a PCP appointment this upcoming Wednesday with Dr. Vega who he will see for the first time. Associated sxs include neck pain. Patient denies any fever, chills, n/v/d, CP, SOB, and all other sxs at this time. No prior Tx reported. No further complaints or concerns at this time.      Patient evaluated by ER and had Ct scan showed a 5.4 x 5.7 cm mass suspected within the right upper lobe with areas of necrosis.  Mass extends into the suprahilar region on the right with enlarged lymph nodes in the right hilum.  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  Additional nodes are seen within the mediastinum measuring 14 mm. Patient also with HTN Urgency. Patient placed in Observation.      Records reviewed show patient has a Hx of nicotine dependence, chronic pain, and HTN.                Overview/Hospital Course:  Patient 56 yo male admitted to hospital with R Lung Mass. Patient evaluated by Pulmonary who recommended BRONCH - EBUS. Patient stable pending procedure today. No events. Discussed POC at bedside with family. Await studies.      Interval  History: Patient 58 yo male admitted to hospital with R Lung Mass. Patient evaluated by Pulmonary who recommended BRONCH - EBUS. Patient stable pending procedure today. No events. Discussed POC at bedside with family. Await studies.    Review of Systems   Constitutional:  Positive for activity change and fatigue. Negative for appetite change and fever.   HENT: Negative.  Negative for congestion, sinus pressure and sore throat.    Eyes: Negative.  Negative for photophobia, discharge and visual disturbance.   Respiratory:  Positive for cough and shortness of breath. Negative for chest tightness and wheezing.    Cardiovascular: Negative.  Negative for chest pain and palpitations.   Gastrointestinal: Negative.  Negative for abdominal pain, blood in stool, constipation, diarrhea, nausea and vomiting.   Endocrine: Negative.    Genitourinary: Negative.    Musculoskeletal:  Positive for myalgias and neck pain. Negative for neck stiffness.   Skin: Negative.    Allergic/Immunologic: Negative.    Neurological:  Positive for weakness. Negative for seizures, syncope and headaches.   Hematological: Negative.    Psychiatric/Behavioral: Negative.  Negative for agitation, behavioral problems, hallucinations, self-injury and suicidal ideas.    Objective:     Vital Signs (Most Recent):  Temp: 98.6 °F (37 °C) (09/03/22 0817)  Pulse: 77 (09/03/22 0817)  Resp: 16 (09/03/22 0817)  BP: (!) 135/95 (09/03/22 0817)  SpO2: 98 % (09/03/22 0817)   Vital Signs (24h Range):  Temp:  [97.4 °F (36.3 °C)-98.9 °F (37.2 °C)] 98.6 °F (37 °C)  Pulse:  [] 77  Resp:  [16-31] 16  SpO2:  [96 %-100 %] 98 %  BP: (119-211)/() 135/95     Weight: 84.4 kg (186 lb)  Body mass index is 21.49 kg/m².  No intake or output data in the 24 hours ending 09/03/22 0931   Physical Exam  Vitals and nursing note reviewed.   Constitutional:       General: He is not in acute distress.     Appearance: He is well-developed. He is not toxic-appearing or diaphoretic.    HENT:      Head: Normocephalic and atraumatic.   Eyes:      Pupils: Pupils are equal, round, and reactive to light.   Cardiovascular:      Rate and Rhythm: Normal rate and regular rhythm.      Heart sounds: Normal heart sounds. No murmur heard.  Pulmonary:      Effort: Pulmonary effort is normal. No respiratory distress.      Breath sounds: Normal breath sounds. No wheezing.   Abdominal:      General: Bowel sounds are normal. There is no distension.      Palpations: Abdomen is soft. There is no mass.      Tenderness: There is no abdominal tenderness.   Musculoskeletal:         General: No deformity. Normal range of motion.      Cervical back: Normal range of motion and neck supple. Tenderness present.      Right lower leg: No edema.      Left lower leg: No edema.   Skin:     General: Skin is warm and dry.      Capillary Refill: Capillary refill takes 2 to 3 seconds.   Neurological:      General: No focal deficit present.      Mental Status: He is alert and oriented to person, place, and time. Mental status is at baseline.      Deep Tendon Reflexes: Reflexes are normal and symmetric.   Psychiatric:         Mood and Affect: Mood normal.         Behavior: Behavior normal.         Thought Content: Thought content normal.         Judgment: Judgment normal.       Significant Labs: All pertinent labs within the past 24 hours have been reviewed.  BMP:   Recent Labs   Lab 09/02/22  1101   *      K 3.6      CO2 27   BUN 12   CREATININE 1.2   CALCIUM 10.2     CBC:   Recent Labs   Lab 09/02/22  1101   WBC 7.84   HGB 13.8*   HCT 42.1   *     CMP:   Recent Labs   Lab 09/02/22  1101      K 3.6      CO2 27   *   BUN 12   CREATININE 1.2   CALCIUM 10.2   PROT 8.4   ALBUMIN 3.2*   BILITOT 0.9   ALKPHOS 138*   AST 13   ALT 8*   ANIONGAP 9     Lipase: No results for input(s): LIPASE in the last 48 hours.  Troponin:   Recent Labs   Lab 09/02/22  1101   TROPONINI 0.008     Imaging Results               CT Chest With Contrast (Final result)  Result time 09/02/22 13:27:56      Final result by Srini Faith MD (09/02/22 13:27:56)                   Impression:      5.4 x 5.7 cm mass suspected within the right upper lobe with areas of necrosis. Mass extends into the suprahilar region on the right with enlarged lymph nodes in the right hilum. 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  Additional nodes are seen within the mediastinum measuring 14 mm.  Recommend pulmonary referral as well as PET scan and/or tissue sampling.    All CT scans at this facility use dose modulation, iterative reconstruction and/or weight based dosing when appropriate to reduce radiation dose to as low as reasonably achievable.      Electronically signed by: Srini Faith MD  Date:    09/02/2022  Time:    13:27               Narrative:    EXAMINATION:  CT CHEST WITH CONTRAST    CLINICAL HISTORY:  Abnormal xray - lung nodule, >= 1 cm;    TECHNIQUE:  The chest was surveyed from the apices through the posterior costophrenic angles after administration of 100 cc of IV contrast..  Data was reconstructed for multiplanar images in axial, sagittal and coronal planes in for maximal intensity projection images in the axial plane.    COMPARISON:  09/02/2022    FINDINGS:  Base of Neck: No significant abnormality.    Airways: Patent centrally.  Right upper lobe bronchus is occluded by mass.    Lungs: 5.4 x 5.7 cm mass suspected within the right upper lobe with areas of necrosis.  Mass extends into the suprahilar region on the right with enlarged lymph nodes in the right hilum.  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  Additional nodes are seen within the mediastinum measuring 14 mm.  Recommend PET scan and/or tissue sampling.    Pleura: Trace right pleural effusion and dependent changes right lung base.No pleural  calcification.    Brandy/Mediastinum: Right hilar adenopathy as well as mediastinal adenopathy concerning for metastatic disease.    Esophagus: Normal.    Heart/pericardium: Normal size.  No pericardial effusion or calcification.    Pulmonary vasculature: Pulmonary arteries distribute normally.  There are four pulmonary veins.    Aorta: Left-sided aortic arch with 3 arterial branches.  The aorta maintains normal caliber, contour and course. There is mild calcification of the thoracic aorta.  There is  mild coronary artery calcification.    Thoracic soft tissues: Normal. Both breasts are present.    Bones: No acute fracture. No suspicious lytic or sclerotic lesion.    Upper Abdomen: Mild nodular thickening the left adrenal gland without discrete mass.  Mild constipation within the large bowel visualized.  Small hiatal hernia.                                       X-Ray Chest 1 View (Final result)  Result time 09/02/22 10:57:56      Final result by Roma Bañuelos MD (09/02/22 10:57:56)                   Impression:      New large right suprahilar mass.  Further evaluation with CT chest recommended with intravenous contrast.      Electronically signed by: Roma Bañuelos  Date:    09/02/2022  Time:    10:57               Narrative:    EXAMINATION:  XR CHEST 1 VIEW    CLINICAL HISTORY:  Chest pain, unspecified    TECHNIQUE:  Single frontal view of the chest was performed.    COMPARISON:  Chest radiograph 10/25/2020    FINDINGS:  There is interval development of a large medial right upper lung mass.There is mild bibasilar interstitial opacity.  Possible trace right pleural fluid.  No significant left pleural fluid.  No pneumothorax.  There is biapical pleuroparenchymal scarring.    The cardiac silhouette is stable.  Thoracic aorta is tortuous.    No acute osseous abnormality.                                        Significant Imaging: I have reviewed all pertinent imaging results/findings within the past 24  hours.      Assessment/Plan:      * Right Lung mass with trace right pleural effusion  Telemetry  Consult Pulmonology  NPO after midnight for possible  brochoscopy with Bx tomorrow  Patient discussed with Pulmonology and does not require Abx at this time      CAD (coronary artery disease)- Mild noted on CT scan  Will need outpatient follow up       Hypoalbuminemia  Monitor      Thrombocytosis  Monitor      Hypertensive urgency  Blood pressure better after Iv hydralazine and po clonidine in ER  Start lisinopril  Add prn hydralazine  Improved    Nicotine dependence  Smoking cessation education > 3 minutes  Add nicotine patch        VTE Risk Mitigation (From admission, onward)         Ordered     IP VTE HIGH RISK PATIENT  Once         09/02/22 1752     Place sequential compression device  Until discontinued         09/02/22 1752     Place YNES hose  Until discontinued         09/02/22 1752     Reason for No Pharmacological VTE Prophylaxis  Once        Question:  Reasons:  Answer:  Risk of Bleeding    09/02/22 1752                Discharge Planning   TASNEEM:      Code Status: Full Code   Is the patient medically ready for discharge?:     Reason for patient still in hospital (select all that apply): Patient trending condition, Consult recommendations and Pending disposition                     Mahesh Canchola MD  Department of Hospital Medicine   [facility] - Telemetry ([facility])

## 2022-09-03 NOTE — SUBJECTIVE & OBJECTIVE
Interval History:   09/03/2022 Seen and examined: tolerated procedure, some post op nausea. Results explained, CXR revewied    Respiratory ROS: no cough, shortness of breath, or wheezing     Objective:     Vital Signs (Most Recent):  Temp: 98.7 °F (37.1 °C) (09/03/22 1107)  Pulse: 87 (09/03/22 1107)  Resp: 18 (09/03/22 1107)  BP: 124/83 (09/03/22 1107)  SpO2: 98 % (09/03/22 1107) Vital Signs (24h Range):  Temp:  [97.4 °F (36.3 °C)-98.9 °F (37.2 °C)] 98.7 °F (37.1 °C)  Pulse:  [] 87  Resp:  [16-31] 18  SpO2:  [95 %-100 %] 98 %  BP: (119-190)/() 124/83     Weight: 84.4 kg (186 lb)  Body mass index is 21.49 kg/m².      Intake/Output Summary (Last 24 hours) at 9/3/2022 1426  Last data filed at 9/3/2022 0931  Gross per 24 hour   Intake 500 ml   Output --   Net 500 ml       Physical Exam  Vitals and nursing note reviewed.   Constitutional:       Appearance: Normal appearance.   HENT:      Head: Normocephalic and atraumatic.      Mouth/Throat:      Mouth: Mucous membranes are dry.   Eyes:      Pupils: Pupils are equal, round, and reactive to light.   Cardiovascular:      Rate and Rhythm: Normal rate and regular rhythm.      Pulses: Normal pulses.      Heart sounds: Normal heart sounds.   Pulmonary:      Effort: Pulmonary effort is normal.      Breath sounds: Normal breath sounds.   Musculoskeletal:         General: Normal range of motion.      Cervical back: Normal range of motion and neck supple.   Skin:     General: Skin is warm.   Neurological:      General: No focal deficit present.      Mental Status: He is alert.       Vents:       Lines/Drains/Airways       Peripheral Intravenous Line  Duration                  Peripheral IV - Single Lumen 09/02/22 1120 20 G Posterior;Right Hand 1 day                    Significant Labs:    CBC/Anemia Profile:  Recent Labs   Lab 09/02/22  1101   WBC 7.84   HGB 13.8*   HCT 42.1   *   *   RDW 13.9        Chemistries:  Recent Labs   Lab 09/02/22  1101       K 3.6      CO2 27   BUN 12   CREATININE 1.2   CALCIUM 10.2   ALBUMIN 3.2*   PROT 8.4   BILITOT 0.9   ALKPHOS 138*   ALT 8*   AST 13       ABGs: No results for input(s): PH, PCO2, HCO3, POCSATURATED, BE in the last 48 hours.  POCT Glucose: No results for input(s): POCTGLUCOSE in the last 48 hours.  Respiratory Culture: No results for input(s): GSRESP, RESPIRATORYC in the last 48 hours.  Urine Culture: No results for input(s): LABURIN in the last 48 hours.  All pertinent labs within the past 24 hours have been reviewed.    Significant Imaging:  I have reviewed all pertinent imaging results/findings within the past 24 hours.    X-Ray Chest 1 View  Narrative: EXAMINATION:  XR CHEST 1 VIEW    CLINICAL HISTORY:  Post Bronchoscopy;  right upper lobe lung mass.    COMPARISON:  09/02/2022    FINDINGS:  Opacity right upper lobe compatible patient's history of lung mass.  No significant interval change.  Negative for pneumothorax.  Left lung clear.  Heart size within normal limits.No significant bony findings.  Impression: Status post bronchoscopy.  Negative for pneumothorax.    Electronically signed by: Srinivasan Tomlin  Date:    09/03/2022  Time:    10:22

## 2022-09-03 NOTE — HOSPITAL COURSE
Patient 56 yo male admitted to hospital with R Lung Mass. Patient evaluated by Pulmonary who recommended BRONCH - EBUS. Patient stable pending procedure today. No events. Discussed POC at bedside with family. Await studies.  Patient underwent bronchoscopy with biopsy. Patient tolerated procedure without complication. Patient stable for a safe discharge to home awaiting biopsy result, follow up with Pulmonary CC.

## 2022-09-03 NOTE — ANESTHESIA PROCEDURE NOTES
Intubation    Date/Time: 3/2/2022 8:03 AM  Performed by: Desmond Marroquin CRNA  Authorized by: Basil Berkowitz MD     Intubation:     Induction:  Intravenous    Intubated:  Postinduction    Mask Ventilation:  Easy mask    Attempts:  1    Attempted By:  CRNA    Method of Intubation:  Video laryngoscopy    Blade:  Cruz 3    Laryngeal View Grade: Grade I - full view of cords      Difficult Airway Encountered?: No      Complications:  None    Airway Device:  Oral endotracheal tube    Airway Device Size:  7.5    Style/Cuff Inflation:  Cuffed    Inflation Amount (mL):  5    Tube secured:  21    Secured at:  The lips    Placement Verified By:  Capnometry and Revisualization with laryngoscopy    Complicating Factors:  None    Findings Post-Intubation:  BS equal bilateral and atraumatic/condition of teeth unchanged     Intubation    Date/Time: 9/3/2022 8:34 AM  Performed by: Marques Palacios CRNA  Authorized by: Manuel King MD     Intubation:     Induction:  Intravenous    Intubated:  Postinduction    Mask Ventilation:  Easy mask    Attempts:  1    Attempted By:  CRNA    Method of Intubation:  Direct    Blade:  Beltran 2    Laryngeal View Grade: Grade IIb - only the arytenoids and epiglottis seen      Difficult Airway Encountered?: No      Complications:  None    Airway Device:  Oral endotracheal tube    Airway Device Size:  8.5    Style/Cuff Inflation:  Cuffed    Tube secured:  22    Secured at:  The lips    Placement Verified By:  Capnometry    Complicating Factors:  None    Findings Post-Intubation:  BS equal bilateral

## 2022-09-06 ENCOUNTER — PATIENT OUTREACH (OUTPATIENT)
Dept: ADMINISTRATIVE | Facility: CLINIC | Age: 57
End: 2022-09-06
Payer: COMMERCIAL

## 2022-09-06 RX ORDER — OXYCODONE AND ACETAMINOPHEN 7.5; 325 MG/1; MG/1
1 TABLET ORAL EVERY 4 HOURS PRN
Qty: 30 TABLET | Refills: 0 | Status: SHIPPED | OUTPATIENT
Start: 2022-09-06 | End: 2022-09-22

## 2022-09-06 NOTE — PROGRESS NOTES
Mahesh Canchola MD responded to message from C3 regarding pain medication order for patient. Phoned pt and verified pharmacy, message sent to Dr Canchola specifying pharmacy, pt aware and will check on rx later this evening.

## 2022-09-06 NOTE — PROGRESS NOTES
C3 nurse spoke with name  for a TCC post hospital discharge follow up call. The patient has a scheduled HOSFU appointment with Celeste Vega on 9/7/22 @ 6546.

## 2022-09-09 ENCOUNTER — TELEPHONE (OUTPATIENT)
Dept: PULMONOLOGY | Facility: HOSPITAL | Age: 57
End: 2022-09-09
Payer: COMMERCIAL

## 2022-09-09 DIAGNOSIS — C34.90 MALIGNANT NEOPLASM OF LUNG, UNSPECIFIED LATERALITY, UNSPECIFIED PART OF LUNG: Primary | ICD-10-CM

## 2022-09-09 LAB
FINAL PATHOLOGIC DIAGNOSIS: ABNORMAL
Lab: ABNORMAL

## 2022-09-09 NOTE — TELEPHONE ENCOUNTER
1.  Right upper lobe lung mass, fine needle aspiration (4 smears, 1 cell   block):    Positive for malignancy.  Squamous cell carcinoma.   Comment:  Molecular studies will be attempted on cell block material.   2. Station 10R node, fine needle aspiration (8 smears, 1 cell block):    Negative for malignant cells.    No definitive lymph node tissue identified   3. Station 4R lymph node, fine needle aspiration (6 smears, 1 cell block):    Negative for malignant cells.    No definitive lymph node tissue identified    Predominantly blood   4. Bronchial brushing, for cytology (1 ThinPrep):    Positive for malignancy.  Favor squamous cell carcinoma.      Comment: Interp By Lalita Hicks M.D., Signed on 09/09/2022 at 09:46   Disclaimer  Abnormal   Screening was performed at [facility] [address] [facility] [place] [address].        Orders Placed This Encounter   Procedures    NM PET CT Routine Skull to Mid Thigh     Standing Status:   Future     Standing Expiration Date:   9/9/2023    Ambulatory referral/consult to Hematology / Oncology     Standing Status:   Future     Standing Expiration Date:   10/9/2023     Referral Priority:   Routine     Referral Type:   Consultation     Referral Reason:   Specialty Services Required     Requested Specialty:   Hematology and Oncology     Number of Visits Requested:   1    Ambulatory referral/consult to Radiation Oncology     Standing Status:   Future     Standing Expiration Date:   10/9/2023     Referral Priority:   Routine     Referral Type:   Consultation     Referral Reason:   Specialty Services Required     Requested Specialty:   Radiation Oncology     Number of Visits Requested:   1    Complete PFT without bronchodilator - Clinic     Standing Status:   Future     Standing Expiration Date:   9/9/2023     Order Specific Question:   Release to patient     Answer:   Immediate    Stress test, pulmonary     Standing Status:   Future      Standing Expiration Date:   9/9/2023     Order Specific Question:   Reason for study     Answer:   Functional status     Order Specific Question:   Release to patient     Answer:   Immediate    PULM - Arterial Blood Gases--in addition to PFT only     Standing Status:   Future     Standing Expiration Date:   9/9/2023     Order Specific Question:   Release to patient     Answer:   Immediate

## 2022-09-12 ENCOUNTER — CLINICAL SUPPORT (OUTPATIENT)
Dept: PULMONOLOGY | Facility: CLINIC | Age: 57
End: 2022-09-12
Payer: COMMERCIAL

## 2022-09-12 VITALS — BODY MASS INDEX: 23.34 KG/M2 | HEIGHT: 76 IN | WEIGHT: 191.69 LBS

## 2022-09-12 DIAGNOSIS — C34.90 MALIGNANT NEOPLASM OF LUNG, UNSPECIFIED LATERALITY, UNSPECIFIED PART OF LUNG: ICD-10-CM

## 2022-09-12 DIAGNOSIS — R91.8 LUNG MASS: Primary | ICD-10-CM

## 2022-09-12 LAB
ALLENS TEST: ABNORMAL
BRPFT: ABNORMAL
DELSYS: ABNORMAL
DLCO ADJ PRE: 19.66 ML/(MIN*MMHG) (ref 27.66–41.52)
DLCO SINGLE BREATH LLN: 27.66
DLCO SINGLE BREATH PRE REF: 54.3 %
DLCO SINGLE BREATH REF: 34.59
DLCOC SBVA LLN: 3.14
DLCOC SBVA PRE REF: 120.4 %
DLCOC SBVA REF: 4.15
DLCOC SINGLE BREATH LLN: 27.66
DLCOC SINGLE BREATH PRE REF: 56.8 %
DLCOC SINGLE BREATH REF: 34.59
DLCOVA LLN: 3.14
DLCOVA PRE REF: 115 %
DLCOVA PRE: 4.77 ML/(MIN*MMHG*L) (ref 3.14–5.16)
DLCOVA REF: 4.15
DLVAADJ PRE: 4.99 ML/(MIN*MMHG*L) (ref 3.14–5.16)
ERV LLN: -16448.63
ERV PRE REF: 87.1 %
ERV REF: 1.37
FEF 25 75 LLN: 1.34
FEF 25 75 PRE REF: 80.8 %
FEF 25 75 REF: 3.1
FEV1 FVC LLN: 66
FEV1 FVC PRE REF: 101 %
FEV1 FVC REF: 77
FEV1 LLN: 2.74
FEV1 PRE REF: 64.9 %
FEV1 REF: 3.74
FIO2: 21
FRCPLETH LLN: 2.96
FRCPLETH PREREF: 104.8 %
FRCPLETH REF: 3.95
FVC LLN: 3.63
FVC PRE REF: 64 %
FVC REF: 4.85
HCO3 UR-SCNC: 30.8 MMOL/L (ref 24–28)
IVC PRE: 2.96 L (ref 3.63–6.08)
IVC SINGLE BREATH LLN: 3.63
IVC SINGLE BREATH PRE REF: 61 %
IVC SINGLE BREATH REF: 4.85
MODE: ABNORMAL
MVV LLN: 140
MVV PRE REF: 59.4 %
MVV REF: 165
PCO2 BLDA: 43.8 MMHG (ref 35–45)
PEF LLN: 6.91
PEF PRE REF: 47.9 %
PEF REF: 10.04
PH SMN: 7.46 [PH] (ref 7.35–7.45)
PO2 BLDA: 90 MMHG (ref 80–100)
POC BE: 7 MMOL/L
POC SATURATED O2: 97 % (ref 95–100)
PRE DLCO: 18.78 ML/(MIN*MMHG) (ref 27.66–41.52)
PRE ERV: 1.2 L (ref -16448.63–16451.37)
PRE FEF 25 75: 2.51 L/S (ref 1.34–4.87)
PRE FET 100: 9.4 SEC
PRE FEV1 FVC: 78.15 % (ref 66.15–88.61)
PRE FEV1: 2.43 L (ref 2.74–4.75)
PRE FRC PL: 4.14 L
PRE FVC: 3.11 L (ref 3.63–6.08)
PRE MVV: 98 L/MIN (ref 140.32–189.84)
PRE PEF: 4.8 L/S (ref 6.91–13.17)
PRE RV: 2.91 L (ref 1.9–3.25)
PRE TLC: 6.14 L (ref 7.19–9.49)
RAW LLN: 3.06
RAW PRE REF: 67.6 %
RAW PRE: 2.07 CMH2O*S/L (ref 3.06–3.06)
RAW REF: 3.06
RV LLN: 1.9
RV PRE REF: 113 %
RV REF: 2.57
RVTLC LLN: 28
RVTLC PRE REF: 129.5 %
RVTLC PRE: 47.39 % (ref 27.6–45.56)
RVTLC REF: 37
SAMPLE: ABNORMAL
SITE: ABNORMAL
TLC LLN: 7.19
TLC PRE REF: 73.6 %
TLC REF: 8.34
VA PRE: 3.94 L (ref 8.19–8.19)
VA SINGLE BREATH LLN: 8.19
VA SINGLE BREATH PRE REF: 48.1 %
VA SINGLE BREATH REF: 8.19
VC LLN: 3.63
VC PRE REF: 66.5 %
VC PRE: 3.23 L (ref 3.63–6.08)
VC REF: 4.85
VTGRAWPRE: 3.91 L

## 2022-09-12 PROCEDURE — 99999 PR PBB SHADOW E&M-EST. PATIENT-LVL I: ICD-10-PCS | Mod: PBBFAC,,,

## 2022-09-12 PROCEDURE — 82803 BLOOD GASES ANY COMBINATION: CPT | Mod: S$GLB,,, | Performed by: INTERNAL MEDICINE

## 2022-09-12 PROCEDURE — 94010 BREATHING CAPACITY TEST: ICD-10-PCS | Mod: S$GLB,,, | Performed by: INTERNAL MEDICINE

## 2022-09-12 PROCEDURE — 94729 PR C02/MEMBANE DIFFUSE CAPACITY: ICD-10-PCS | Mod: S$GLB,,, | Performed by: INTERNAL MEDICINE

## 2022-09-12 PROCEDURE — 94729 DIFFUSING CAPACITY: CPT | Mod: S$GLB,,, | Performed by: INTERNAL MEDICINE

## 2022-09-12 PROCEDURE — 36600 WITHDRAWAL OF ARTERIAL BLOOD: CPT | Mod: S$GLB,,, | Performed by: INTERNAL MEDICINE

## 2022-09-12 PROCEDURE — 94726 PULM FUNCT TST PLETHYSMOGRAP: ICD-10-PCS | Mod: S$GLB,,, | Performed by: INTERNAL MEDICINE

## 2022-09-12 PROCEDURE — 94618 PULMONARY STRESS TESTING: ICD-10-PCS | Mod: S$GLB,,, | Performed by: INTERNAL MEDICINE

## 2022-09-12 PROCEDURE — 82803 PR  BLOOD GASES: PH, PO2 & PCO2: ICD-10-PCS | Mod: S$GLB,,, | Performed by: INTERNAL MEDICINE

## 2022-09-12 PROCEDURE — 99999 PR PBB SHADOW E&M-EST. PATIENT-LVL I: CPT | Mod: PBBFAC,,,

## 2022-09-12 PROCEDURE — 36600 PR WITHDRAWAL OF ARTERIAL BLOOD: ICD-10-PCS | Mod: S$GLB,,, | Performed by: INTERNAL MEDICINE

## 2022-09-12 PROCEDURE — 94010 BREATHING CAPACITY TEST: CPT | Mod: S$GLB,,, | Performed by: INTERNAL MEDICINE

## 2022-09-12 PROCEDURE — 94618 PULMONARY STRESS TESTING: CPT | Mod: S$GLB,,, | Performed by: INTERNAL MEDICINE

## 2022-09-12 PROCEDURE — 94726 PLETHYSMOGRAPHY LUNG VOLUMES: CPT | Mod: S$GLB,,, | Performed by: INTERNAL MEDICINE

## 2022-09-12 NOTE — PROCEDURES
See ABG results    Primary Metabolic Alkalosis, with: Appropriately Compensated by Respiratory Acidosis

## 2022-09-12 NOTE — PROCEDURES
"[facility] - Pulmonary Function  Six Minute Walk     SUMMARY     Ordering Provider:    Interpreting Provider:   Performing nurse/tech/RT: LUIS Bronson RRT  Diagnosis:  (Malignant Neoplasm of the Lung)  Height: 6' 4" (193 cm)  Weight: 87 kg (191 lb 11 oz)  BMI (Calculated): 23.3   Patient Race:             Phase Oxygen Assessment Supplemental O2 Heart   Rate Blood Pressure Natalie Dyspnea Scale Rating   Resting 98 % Room Air 74 bpm 145/88 0   Exercise        Minute        1 98 % Room Air 106 bpm     2 95 % Room Air 108 bpm     3 96 % Room Air 108 bpm     4 96 % Room Air 105 bpm     5 96 % Room Air 110 bpm     6  99 % Room Air 94 bpm 193/89 0   Recovery        Minute        1 98 % Room Air 75 bpm     2 98 % Room Air 73 bpm     3 98 % Room Air 70 bpm     4 98 % Room Air 64 bpm 173/90 0     Six Minute Walk Summary  6MWT Status: completed without stopping  Patient Reported: No complaints     Interpretation:  Did the patient stop or pause?: No         Total Time Walked (Calculated): 360 seconds  Final Partial Lap Distance (feet): 0 feet  Total Distance Meters (Calculated): 365.76 meters  Predicted Distance Meters (Calculated): 707.82 meters  Percentage of Predicted (Calculated): 51.67  Peak VO2 (Calculated): 14.95  Mets: 4.27  Has The Patient Had a Previous Six Minute Walk Test?: No       Previous 6MWT Results  Has The Patient Had a Previous Six Minute Walk Test?: No         CLINICAL INTERPRETATION:  Six minute walk distance is 365.76m (51.67 % predicted) with no dyspnea.  During exercise, there was no significant desaturation while breathing room air.  Blood pressure increased significantly and Heart rate remained stable with walking.  The patient did not report non-pulmonary symptoms during exercise.  The patient did complete the study, walking 360 seconds of the 360 second test.  The patient may benefit from Blood pressure control.  Based upon age and body mass index, exercise capacity is " less than predicted.      [x] Mild exercise-induced hypoxemia described as an arterial oxygen saturation of 93-95% (or 3-4% less than at rest)  []  Moderate exercise-induced hypoxemia as 89-93%  []  Severe exercise induced hypoxemia as < 89% O2 saturation.  Medicare Criteria for oxygen prescription comments:   []  When arterial oxygen saturation is at or below 88% during exercise (severe exercise induced hypoxemia) then the patient falls under Medicare Group 1 criteria for supplemental oxygen

## 2022-09-13 ENCOUNTER — DOCUMENTATION ONLY (OUTPATIENT)
Dept: HEMATOLOGY/ONCOLOGY | Facility: CLINIC | Age: 57
End: 2022-09-13
Payer: COMMERCIAL

## 2022-09-13 NOTE — PROGRESS NOTES
Called and spoke with pt over the phone about referral from Dr. Inman. Explained my role as NN and gave him my contact information.  Pt states he is aware of the radiation appt but not the PET or oncology appt. Explained to him Dr. Inman ordered him to have prior to onc appt. Was able to offer appt with Dr. Hector same day as PET, a few hours later. Reviewed npo instructions with him. Pt has concern about onc appt time 2pm has to  child(jay) from school. Told him he can have scan, go eat and then come back or after scan check in for Dr. Hector and once results available can ask Dr. Hector to see sooner. He agrees with this plan and has my number should he have any questions/concerns.   Oncology Navigation   Intake  Date of Diagnosis: 09/03/22  Cancer Type: Thoracic  Internal / External Referral: Internal  Referral Source: In basket msg  Date of Referral: 09/12/22  Initial Nurse Navigator Contact: 09/13/22  Referral to Initial Contact Timeline (days): 1  Date Worked: 09/13/22  Appointment Date: 09/21/22  Reason if booked > 7 days after scheduling: Additional tests/procedures (PET 9/21/22)     Treatment  Current Status: Staging work-up       Medical Oncologist: Dr. Hector  Consult Date: 09/21/22    Radiation Oncologist: Dr. Nevarez (consult 9/16/22)             Radiation Oncologist: Dr. Nevarez (consult 9/16/22)        Acuity  Comorbidities in Medical History: 2  Navigation Acuity: 2     Follow Up  No follow-ups on file.

## 2022-09-16 ENCOUNTER — OFFICE VISIT (OUTPATIENT)
Dept: RADIATION ONCOLOGY | Facility: CLINIC | Age: 57
End: 2022-09-16
Payer: COMMERCIAL

## 2022-09-16 VITALS
HEART RATE: 97 BPM | BODY MASS INDEX: 22.31 KG/M2 | TEMPERATURE: 98 F | DIASTOLIC BLOOD PRESSURE: 95 MMHG | RESPIRATION RATE: 18 BRPM | WEIGHT: 192.81 LBS | HEIGHT: 78 IN | SYSTOLIC BLOOD PRESSURE: 160 MMHG | OXYGEN SATURATION: 98 %

## 2022-09-16 DIAGNOSIS — C34.11 MALIGNANT NEOPLASM OF UPPER LOBE OF RIGHT LUNG: Primary | ICD-10-CM

## 2022-09-16 DIAGNOSIS — C34.90 MALIGNANT NEOPLASM OF UNSPECIFIED PART OF UNSPECIFIED BRONCHUS OR LUNG: ICD-10-CM

## 2022-09-16 PROCEDURE — 3080F DIAST BP >= 90 MM HG: CPT | Mod: CPTII,S$GLB,, | Performed by: RADIOLOGY

## 2022-09-16 PROCEDURE — 3008F BODY MASS INDEX DOCD: CPT | Mod: CPTII,S$GLB,, | Performed by: RADIOLOGY

## 2022-09-16 PROCEDURE — 1111F PR DISCHARGE MEDS RECONCILED W/ CURRENT OUTPATIENT MED LIST: ICD-10-PCS | Mod: CPTII,S$GLB,, | Performed by: RADIOLOGY

## 2022-09-16 PROCEDURE — 3077F SYST BP >= 140 MM HG: CPT | Mod: CPTII,S$GLB,, | Performed by: RADIOLOGY

## 2022-09-16 PROCEDURE — 4010F ACE/ARB THERAPY RXD/TAKEN: CPT | Mod: CPTII,S$GLB,, | Performed by: RADIOLOGY

## 2022-09-16 PROCEDURE — 3077F PR MOST RECENT SYSTOLIC BLOOD PRESSURE >= 140 MM HG: ICD-10-PCS | Mod: CPTII,S$GLB,, | Performed by: RADIOLOGY

## 2022-09-16 PROCEDURE — 99999 PR PBB SHADOW E&M-EST. PATIENT-LVL IV: ICD-10-PCS | Mod: PBBFAC,,, | Performed by: RADIOLOGY

## 2022-09-16 PROCEDURE — 3008F PR BODY MASS INDEX (BMI) DOCUMENTED: ICD-10-PCS | Mod: CPTII,S$GLB,, | Performed by: RADIOLOGY

## 2022-09-16 PROCEDURE — 3044F PR MOST RECENT HEMOGLOBIN A1C LEVEL <7.0%: ICD-10-PCS | Mod: CPTII,S$GLB,, | Performed by: RADIOLOGY

## 2022-09-16 PROCEDURE — 99205 PR OFFICE/OUTPT VISIT, NEW, LEVL V, 60-74 MIN: ICD-10-PCS | Mod: S$GLB,,, | Performed by: RADIOLOGY

## 2022-09-16 PROCEDURE — 4010F PR ACE/ARB THEARPY RXD/TAKEN: ICD-10-PCS | Mod: CPTII,S$GLB,, | Performed by: RADIOLOGY

## 2022-09-16 PROCEDURE — 1111F DSCHRG MED/CURRENT MED MERGE: CPT | Mod: CPTII,S$GLB,, | Performed by: RADIOLOGY

## 2022-09-16 PROCEDURE — 1159F PR MEDICATION LIST DOCUMENTED IN MEDICAL RECORD: ICD-10-PCS | Mod: CPTII,S$GLB,, | Performed by: RADIOLOGY

## 2022-09-16 PROCEDURE — 3080F PR MOST RECENT DIASTOLIC BLOOD PRESSURE >= 90 MM HG: ICD-10-PCS | Mod: CPTII,S$GLB,, | Performed by: RADIOLOGY

## 2022-09-16 PROCEDURE — 99999 PR PBB SHADOW E&M-EST. PATIENT-LVL IV: CPT | Mod: PBBFAC,,, | Performed by: RADIOLOGY

## 2022-09-16 PROCEDURE — 1159F MED LIST DOCD IN RCRD: CPT | Mod: CPTII,S$GLB,, | Performed by: RADIOLOGY

## 2022-09-16 PROCEDURE — 3044F HG A1C LEVEL LT 7.0%: CPT | Mod: CPTII,S$GLB,, | Performed by: RADIOLOGY

## 2022-09-16 PROCEDURE — 99205 OFFICE O/P NEW HI 60 MIN: CPT | Mod: S$GLB,,, | Performed by: RADIOLOGY

## 2022-09-16 NOTE — PROGRESS NOTES
[facility] [place]  RADIATION ONCOLOGY CONSULTATION    Name: Srinivasan Saucedo III  : 1965      Patient Referred To Radiation Oncology By:  Dr. Rakesh Inman MD  [id number] [place] [address]    DIAGNOSIS: R lung squamous cell carcinoma, incompletely staged, mH2D4Oz    HISTORY OF PRESENT ILLNESS:  Srinivasan Saucedo III is a 57 y.o. male who presents for consultation for the above diagnosis.   CT chest 22 showed 5.4 x 5.7cm mass in RUL with necrosis extending into suprahilar region with R hilar adenopathy, additional mediastinal nodes noted.    Biopsy pathology 9/3/22 from RUL showed squamous cell carcinoma, 4R negative and bronchial brushing positive.     Today, he has some chest pain in R chest.   Denies hemoptysis, worsening shortness of breath, dysphagia/odynophagia, or weight loss.  Still eating well.    REVIEW OF SYSTEMS: (Positive findings bold, otherwise negative)   Constitutional: fever, fatigue, weight change  Eyes: blurred vision in the past 3 months, double vision   ENT: ear pain, new mouth lesions, jaw pain, difficulty swallowing, sore throat  Cardiovascular: chest pain on exertion, reflux, leg swelling  Respiratory: shortness of breath, dyspnea, cough, hemoptysis.   GI: abdominal pain, diarrhea, constipation, blood in stool, painful bowel movements  : painful or burning urination, blood in urine  Musculoskeletal: new bone or joint pains  Neurologic: headache, seizure, focal numbness or tingling, balance changes, speech changes  Lymph: new or enlarged lymph nodes  Psychiatric: depression, anxiety    PRIOR RADIATION HISTORY: none    PAST MEDICAL HISTORY:  Past Medical History:   Diagnosis Date    Hypertension 2022       PAST SURGICAL HISTORY:  Past Surgical History:   Procedure Laterality Date    ENDOBRONCHIAL ULTRASOUND Bilateral 9/3/2022    Procedure: ENDOBRONCHIAL ULTRASOUND (EBUS);  Surgeon: Rakesh Inman MD;  Location: [facility];  Service: Endoscopy;   "Laterality: Bilateral;  Bronch biopsy and EBUS       ALLERGIES:   Review of patient's allergies indicates:  No Known Allergies    MEDICATIONS:    Current Outpatient Medications:     enalapril (VASOTEC) 20 MG tablet, Take 1 tablet (20 mg total) by mouth once daily., Disp: 30 tablet, Rfl: 0    oxyCODONE-acetaminophen (PERCOCET) 7.5-325 mg per tablet, Take 1 tablet by mouth every 4 (four) hours as needed for Pain., Disp: 30 tablet, Rfl: 0    SOCIAL HISTORY:  Social History     Socioeconomic History    Marital status:     Number of children: 2   Tobacco Use    Smoking status: Former     Packs/day: 1.00     Types: Cigarettes     Quit date: 2022     Years since quittin.0    Smokeless tobacco: Never   Substance and Sexual Activity    Alcohol use: Yes     Comment: occasional    Drug use: No    Sexual activity: Yes     Partners: Female     Lives in     FAMILY HISTORY:  Family History   Problem Relation Age of Onset    Alcohol abuse Father        PHYSICAL EXAMINATION:  Constitutional: well appearing, no acute distress, ECOG 0 - Fully Active  Vitals:    BP (!) 160/95   Pulse 97   Temp 98.2 °F (36.8 °C)   Resp 18   Ht 6' 6" (1.981 m)   Wt 87.5 kg (192 lb 12.8 oz)   SpO2 98%   BMI 22.28 kg/m²   Eyes: sclera anicteric, EOMI, pupils equal, round and reactive to light  ENT: oral cavity without lesions, moist mucous membranes  Neck: trachea midline, neck supple  Lymphatic: no cervical, supraclavicular or axillary adenopathy  Cardiovascular: regular rate, no edema of the upper or lower extremities, radial pulse 2+  Respiratory: unlabored effort, clear to auscultation, no wheezes   Abdomen: soft, non-tender, no rigidity, no masses, no hepatomegaly  Neuro: Cranial nerves III-XII intact, speech not slurred, gait non-ataxic, no dysdiadochokinesia, strength 5/5 upper and lower extremities, reflexes patellar  Spine: non-tender to percussion cervical, thoracic and lumbosacral spine    IMAGING AND LABORATORY FINDINGS: " As per HPI; images reviewed personally.    ASSESSMENT: 57 y.o. male with R lung squamous cell carcinoma, incompletely staged, [id number]    PLAN: Mr. Saucedo has a locally advanced lung cancer at minimum and if not metastatic,  I would recommend radiation to 60Gy/30fx with concurrent chemotherapy per medical oncology. Some potential toxicities include fatigue, esophagitis, and risk of pneumonitis.  MRI brain will be ordered to complete staging.    His entire treatment plan will be dependent on PET; if metastatic disease is found he will mainly be treated with systemic therapy and radiation used for local palliation.    We discussed the techniques, toxicities and indications of radiation and I answered the patient's questions to their apparent satisfaction.     Will bring back for CT simulation and informed consent if PET doesn't show metastatic disease.    I spent approximately 60 minutes reviewing the available records and evaluating the patient, out of which over 50% of the time was spent face to face with the patient in counseling and coordinating this patient's care.    Kenzie Nevarez III, M.D.  Radiation Oncology  [facility] [address] [place] [address]  Ph: [phone]  [email or web address]

## 2022-09-29 ENCOUNTER — DOCUMENTATION ONLY (OUTPATIENT)
Dept: HEMATOLOGY/ONCOLOGY | Facility: CLINIC | Age: 57
End: 2022-09-29
Payer: COMMERCIAL

## 2022-09-29 ENCOUNTER — TELEPHONE (OUTPATIENT)
Dept: HEMATOLOGY/ONCOLOGY | Facility: CLINIC | Age: 57
End: 2022-09-29
Payer: COMMERCIAL

## 2022-09-29 NOTE — TELEPHONE ENCOUNTER
Returned pt call, has questions about referral to another oncologist per FC discussion with pt. Pt would like to know if the process has begun. Told him I will reach out to SW for status and notify him. He agrees with this, has my number should he need to call me. Spoke with SW, states she will contact pt.

## 2022-09-29 NOTE — PROGRESS NOTES
SWEr was consulted to refer patient out due to high balance. Swer contacted patient on today and patient chose [facility] oncology. Swer faxed referral to Cecile at [facility] oncology at [phone]. Swer will remain available.

## 2022-10-06 ENCOUNTER — SOCIAL WORK (OUTPATIENT)
Dept: HEMATOLOGY/ONCOLOGY | Facility: CLINIC | Age: 57
End: 2022-10-06
Payer: COMMERCIAL

## 2022-10-06 NOTE — PROGRESS NOTES
KENIA kiser called [facility] oncology to follow up about a referral for pt. KENIA kiser left a message for them to call back. KENIA kiser will remain available.

## 2022-10-10 ENCOUNTER — DOCUMENTATION ONLY (OUTPATIENT)
Dept: HEMATOLOGY/ONCOLOGY | Facility: CLINIC | Age: 57
End: 2022-10-10
Payer: COMMERCIAL

## 2022-10-10 NOTE — PROGRESS NOTES
Khushboo received email from [facility] to send over [facility] records. Swer faxed additional clinicals to [facility] at [phone]. SWER will remain available.

## 2022-10-12 ENCOUNTER — TELEPHONE (OUTPATIENT)
Dept: PULMONOLOGY | Facility: CLINIC | Age: 57
End: 2022-10-12
Payer: COMMERCIAL

## 2022-10-12 NOTE — TELEPHONE ENCOUNTER
----- Message from Lidya Roberts sent at 10/12/2022  1:54 PM CDT -----  Contact: [facility]reyda prior authorization department  Bernice is requesting a call in regards to patients pt scan. She stated she have some questions regarding the order. Please call her back at [phone]. Thanks KB

## 2022-10-15 LAB
DNA RANGE(S) EXAMINED NAR: NORMAL
GENE DIS ANL INTERP-IMP: POSITIVE
GENE DIS ASSESSED: NORMAL
GENE MUT TESTED BLD/T: 11.1 M/MB
MSI CA SPEC-IMP: NORMAL
REASON FOR STUDY: NORMAL
TEMPUS FUSIONADDENDUM: NORMAL
TEMPUS LCA: NORMAL
TEMPUS PERTINENTNEGATIVES: NORMAL
TEMPUS PORTAL: NORMAL
TEMPUS TRIAL1: NORMAL
TEMPUS TRIAL2: NORMAL
TEMPUS TRIAL3: NORMAL
TEMPUS TRIALCOUNT: 3

## 2022-10-18 ENCOUNTER — TELEPHONE (OUTPATIENT)
Dept: PULMONOLOGY | Facility: CLINIC | Age: 57
End: 2022-10-18
Payer: COMMERCIAL

## 2022-10-18 NOTE — TELEPHONE ENCOUNTER
"----- Message from Jessie Marquez sent at 10/18/2022  9:50 AM CDT -----  Regarding: CT  Contact: [facility] dept  Ms Littlejohn needs to know if the CT scan the patient had yesterday with them can be seen on patients "anywhere care tab" on chart or should she fax a report, please call her back at [phone]    "

## 2022-10-24 PROBLEM — I10 ESSENTIAL HYPERTENSION, BENIGN: Status: ACTIVE | Noted: 2022-10-24

## 2022-10-24 PROBLEM — R55 SYNCOPE AND COLLAPSE: Status: ACTIVE | Noted: 2022-10-24

## 2023-10-18 NOTE — ED NOTES
Pt resting in bed. NAD, pt hypertensive , RR equal and unlabored. Will continue to monitor     Albendazole Pregnancy And Lactation Text: This medication is Pregnancy Category C and it isn't known if it is safe during pregnancy. It is also excreted in breast milk.